# Patient Record
Sex: MALE | Race: WHITE | NOT HISPANIC OR LATINO | Employment: OTHER
[De-identification: names, ages, dates, MRNs, and addresses within clinical notes are randomized per-mention and may not be internally consistent; named-entity substitution may affect disease eponyms.]

---

## 2023-12-19 ENCOUNTER — EXTERNAL RECORD (OUTPATIENT)
Dept: HEALTH INFORMATION MANAGEMENT | Facility: OTHER | Age: 63
End: 2023-12-19

## 2024-02-13 RX ORDER — ATORVASTATIN CALCIUM 20 MG/1
20 TABLET, FILM COATED ORAL DAILY
COMMUNITY

## 2024-02-13 RX ORDER — HYDROCODONE BITARTRATE AND ACETAMINOPHEN 10; 325 MG/1; MG/1
1 TABLET ORAL EVERY 6 HOURS PRN
Status: ON HOLD | COMMUNITY
End: 2024-02-19

## 2024-02-13 RX ORDER — FLUTICASONE PROPIONATE 50 MCG
1 SPRAY, SUSPENSION (ML) NASAL DAILY
COMMUNITY

## 2024-02-13 RX ORDER — GABAPENTIN 300 MG/1
300 CAPSULE ORAL 2 TIMES DAILY
COMMUNITY

## 2024-02-13 RX ORDER — MONTELUKAST SODIUM 10 MG/1
10 TABLET ORAL DAILY
COMMUNITY

## 2024-02-13 RX ORDER — ALBUTEROL SULFATE 90 UG/1
2 AEROSOL, METERED RESPIRATORY (INHALATION) EVERY 6 HOURS PRN
COMMUNITY

## 2024-02-13 RX ORDER — ENALAPRIL MALEATE 5 MG/1
5 TABLET ORAL DAILY
COMMUNITY

## 2024-02-13 RX ORDER — METOPROLOL SUCCINATE 25 MG/1
25 TABLET, EXTENDED RELEASE ORAL DAILY
COMMUNITY

## 2024-02-17 ENCOUNTER — LAB ENCOUNTER (OUTPATIENT)
Dept: LAB | Facility: HOSPITAL | Age: 64
End: 2024-02-17
Attending: ORTHOPAEDIC SURGERY
Payer: MEDICARE

## 2024-02-17 DIAGNOSIS — Z01.818 PRE-OP TESTING: ICD-10-CM

## 2024-02-17 LAB
ANTIBODY SCREEN: NEGATIVE
RH BLOOD TYPE: POSITIVE
RH BLOOD TYPE: POSITIVE

## 2024-02-17 PROCEDURE — 86901 BLOOD TYPING SEROLOGIC RH(D): CPT

## 2024-02-17 PROCEDURE — 36415 COLL VENOUS BLD VENIPUNCTURE: CPT

## 2024-02-17 PROCEDURE — 86900 BLOOD TYPING SEROLOGIC ABO: CPT

## 2024-02-17 PROCEDURE — 86850 RBC ANTIBODY SCREEN: CPT

## 2024-02-19 ENCOUNTER — HOSPITAL ENCOUNTER (INPATIENT)
Facility: HOSPITAL | Age: 64
LOS: 2 days | Discharge: LEFT BEFORE DISCHARGE | End: 2024-02-21
Attending: ORTHOPAEDIC SURGERY | Admitting: ORTHOPAEDIC SURGERY
Payer: MEDICARE

## 2024-02-19 ENCOUNTER — ANESTHESIA (OUTPATIENT)
Dept: SURGERY | Facility: HOSPITAL | Age: 64
End: 2024-02-19
Payer: MEDICARE

## 2024-02-19 ENCOUNTER — APPOINTMENT (OUTPATIENT)
Dept: GENERAL RADIOLOGY | Facility: HOSPITAL | Age: 64
End: 2024-02-19
Attending: ORTHOPAEDIC SURGERY
Payer: MEDICARE

## 2024-02-19 ENCOUNTER — ANESTHESIA EVENT (OUTPATIENT)
Dept: SURGERY | Facility: HOSPITAL | Age: 64
End: 2024-02-19
Payer: MEDICARE

## 2024-02-19 DIAGNOSIS — Z01.818 PRE-OP TESTING: Primary | ICD-10-CM

## 2024-02-19 PROBLEM — M54.16 LUMBAR RADICULOPATHY: Status: ACTIVE | Noted: 2024-02-19

## 2024-02-19 PROCEDURE — 01NB0ZZ RELEASE LUMBAR NERVE, OPEN APPROACH: ICD-10-PCS | Performed by: ORTHOPAEDIC SURGERY

## 2024-02-19 PROCEDURE — 00NY0ZZ RELEASE LUMBAR SPINAL CORD, OPEN APPROACH: ICD-10-PCS | Performed by: ORTHOPAEDIC SURGERY

## 2024-02-19 PROCEDURE — 0ST20ZZ RESECTION OF LUMBAR VERTEBRAL DISC, OPEN APPROACH: ICD-10-PCS | Performed by: ORTHOPAEDIC SURGERY

## 2024-02-19 PROCEDURE — 0SG1071 FUSION OF 2 OR MORE LUMBAR VERTEBRAL JOINTS WITH AUTOLOGOUS TISSUE SUBSTITUTE, POSTERIOR APPROACH, POSTERIOR COLUMN, OPEN APPROACH: ICD-10-PCS | Performed by: ORTHOPAEDIC SURGERY

## 2024-02-19 PROCEDURE — 76000 FLUOROSCOPY <1 HR PHYS/QHP: CPT | Performed by: ORTHOPAEDIC SURGERY

## 2024-02-19 PROCEDURE — HZ2ZZZZ DETOXIFICATION SERVICES FOR SUBSTANCE ABUSE TREATMENT: ICD-10-PCS | Performed by: INTERNAL MEDICINE

## 2024-02-19 PROCEDURE — 0SG10AJ FUSION OF 2 OR MORE LUMBAR VERTEBRAL JOINTS WITH INTERBODY FUSION DEVICE, POSTERIOR APPROACH, ANTERIOR COLUMN, OPEN APPROACH: ICD-10-PCS | Performed by: ORTHOPAEDIC SURGERY

## 2024-02-19 PROCEDURE — 4A11X4G MONITORING OF PERIPHERAL NERVOUS ELECTRICAL ACTIVITY, INTRAOPERATIVE, EXTERNAL APPROACH: ICD-10-PCS | Performed by: ORTHOPAEDIC SURGERY

## 2024-02-19 DEVICE — OSTEOCEL PRO LARGE BULK BUY: Type: IMPLANTABLE DEVICE | Site: BACK | Status: FUNCTIONAL

## 2024-02-19 DEVICE — CAGE COHERE TLIF-O 4 14X10X30: Type: IMPLANTABLE DEVICE | Site: BACK | Status: FUNCTIONAL

## 2024-02-19 DEVICE — IMPLANTABLE DEVICE: Type: IMPLANTABLE DEVICE | Site: BACK | Status: FUNCTIONAL

## 2024-02-19 DEVICE — SCREW SPNL L50MM OD6.5MM 2C REDUC RELINE: Type: IMPLANTABLE DEVICE | Site: BACK | Status: FUNCTIONAL

## 2024-02-19 DEVICE — SCREW SPNL L45MM OD6.5MM 2C REDUC RELINE: Type: IMPLANTABLE DEVICE | Site: BACK | Status: FUNCTIONAL

## 2024-02-19 DEVICE — K WIRE FIX NIT BVL BLNT TIP PRECEPT: Type: IMPLANTABLE DEVICE | Site: BACK

## 2024-02-19 DEVICE — SCREW SPNL DIA5.5MM OPN TULIP LOK RELINE: Type: IMPLANTABLE DEVICE | Site: BACK | Status: FUNCTIONAL

## 2024-02-19 RX ORDER — MORPHINE SULFATE 10 MG/ML
6 INJECTION, SOLUTION INTRAMUSCULAR; INTRAVENOUS EVERY 10 MIN PRN
Status: DISCONTINUED | OUTPATIENT
Start: 2024-02-19 | End: 2024-02-19 | Stop reason: HOSPADM

## 2024-02-19 RX ORDER — GLYCOPYRROLATE 0.2 MG/ML
INJECTION, SOLUTION INTRAMUSCULAR; INTRAVENOUS AS NEEDED
Status: DISCONTINUED | OUTPATIENT
Start: 2024-02-19 | End: 2024-02-19 | Stop reason: SURG

## 2024-02-19 RX ORDER — OXYCODONE AND ACETAMINOPHEN 10; 325 MG/1; MG/1
1 TABLET ORAL EVERY 4 HOURS PRN
Status: DISCONTINUED | OUTPATIENT
Start: 2024-02-19 | End: 2024-02-21

## 2024-02-19 RX ORDER — HYDROMORPHONE HYDROCHLORIDE 1 MG/ML
0.6 INJECTION, SOLUTION INTRAMUSCULAR; INTRAVENOUS; SUBCUTANEOUS EVERY 5 MIN PRN
Status: DISCONTINUED | OUTPATIENT
Start: 2024-02-19 | End: 2024-02-19 | Stop reason: HOSPADM

## 2024-02-19 RX ORDER — HYDROMORPHONE HYDROCHLORIDE 1 MG/ML
0.2 INJECTION, SOLUTION INTRAMUSCULAR; INTRAVENOUS; SUBCUTANEOUS EVERY 5 MIN PRN
Status: DISCONTINUED | OUTPATIENT
Start: 2024-02-19 | End: 2024-02-19 | Stop reason: HOSPADM

## 2024-02-19 RX ORDER — DEXAMETHASONE SODIUM PHOSPHATE 4 MG/ML
VIAL (ML) INJECTION AS NEEDED
Status: DISCONTINUED | OUTPATIENT
Start: 2024-02-19 | End: 2024-02-19 | Stop reason: SURG

## 2024-02-19 RX ORDER — OXYCODONE AND ACETAMINOPHEN 10; 325 MG/1; MG/1
2 TABLET ORAL EVERY 4 HOURS PRN
Status: DISCONTINUED | OUTPATIENT
Start: 2024-02-19 | End: 2024-02-21

## 2024-02-19 RX ORDER — ACETAMINOPHEN 500 MG
1000 TABLET ORAL ONCE
Status: COMPLETED | OUTPATIENT
Start: 2024-02-19 | End: 2024-02-19

## 2024-02-19 RX ORDER — NALOXONE HYDROCHLORIDE 0.4 MG/ML
0.08 INJECTION, SOLUTION INTRAMUSCULAR; INTRAVENOUS; SUBCUTANEOUS AS NEEDED
Status: DISCONTINUED | OUTPATIENT
Start: 2024-02-19 | End: 2024-02-19 | Stop reason: HOSPADM

## 2024-02-19 RX ORDER — LORAZEPAM 1 MG/1
1 TABLET ORAL
Status: DISCONTINUED | OUTPATIENT
Start: 2024-02-19 | End: 2024-02-21

## 2024-02-19 RX ORDER — BUPIVACAINE HYDROCHLORIDE AND EPINEPHRINE 5; 5 MG/ML; UG/ML
INJECTION, SOLUTION PERINEURAL AS NEEDED
Status: DISCONTINUED | OUTPATIENT
Start: 2024-02-19 | End: 2024-02-19 | Stop reason: HOSPADM

## 2024-02-19 RX ORDER — DIPHENHYDRAMINE HYDROCHLORIDE 50 MG/ML
25 INJECTION INTRAMUSCULAR; INTRAVENOUS EVERY 4 HOURS PRN
Status: DISCONTINUED | OUTPATIENT
Start: 2024-02-19 | End: 2024-02-21

## 2024-02-19 RX ORDER — LIDOCAINE HYDROCHLORIDE 10 MG/ML
INJECTION, SOLUTION EPIDURAL; INFILTRATION; INTRACAUDAL; PERINEURAL AS NEEDED
Status: DISCONTINUED | OUTPATIENT
Start: 2024-02-19 | End: 2024-02-19 | Stop reason: SURG

## 2024-02-19 RX ORDER — POLYETHYLENE GLYCOL 3350 17 G/17G
17 POWDER, FOR SOLUTION ORAL DAILY PRN
Status: DISCONTINUED | OUTPATIENT
Start: 2024-02-19 | End: 2024-02-21

## 2024-02-19 RX ORDER — SODIUM CHLORIDE, SODIUM LACTATE, POTASSIUM CHLORIDE, CALCIUM CHLORIDE 600; 310; 30; 20 MG/100ML; MG/100ML; MG/100ML; MG/100ML
INJECTION, SOLUTION INTRAVENOUS CONTINUOUS
Status: DISCONTINUED | OUTPATIENT
Start: 2024-02-19 | End: 2024-02-19 | Stop reason: HOSPADM

## 2024-02-19 RX ORDER — SODIUM CHLORIDE 9 MG/ML
INJECTION, SOLUTION INTRAVENOUS CONTINUOUS
Status: DISCONTINUED | OUTPATIENT
Start: 2024-02-19 | End: 2024-02-21

## 2024-02-19 RX ORDER — ONDANSETRON 2 MG/ML
INJECTION INTRAMUSCULAR; INTRAVENOUS AS NEEDED
Status: DISCONTINUED | OUTPATIENT
Start: 2024-02-19 | End: 2024-02-19 | Stop reason: SURG

## 2024-02-19 RX ORDER — MIDAZOLAM HYDROCHLORIDE 1 MG/ML
INJECTION INTRAMUSCULAR; INTRAVENOUS AS NEEDED
Status: DISCONTINUED | OUTPATIENT
Start: 2024-02-19 | End: 2024-02-19 | Stop reason: SURG

## 2024-02-19 RX ORDER — ENEMA 19; 7 G/133ML; G/133ML
1 ENEMA RECTAL ONCE AS NEEDED
Status: DISCONTINUED | OUTPATIENT
Start: 2024-02-19 | End: 2024-02-21

## 2024-02-19 RX ORDER — LORAZEPAM 2 MG/ML
1 INJECTION INTRAMUSCULAR
Status: DISCONTINUED | OUTPATIENT
Start: 2024-02-19 | End: 2024-02-19 | Stop reason: RX

## 2024-02-19 RX ORDER — METOCLOPRAMIDE HYDROCHLORIDE 5 MG/ML
10 INJECTION INTRAMUSCULAR; INTRAVENOUS EVERY 8 HOURS PRN
Status: DISCONTINUED | OUTPATIENT
Start: 2024-02-19 | End: 2024-02-21

## 2024-02-19 RX ORDER — LORAZEPAM 2 MG/ML
2 INJECTION INTRAMUSCULAR
Status: DISCONTINUED | OUTPATIENT
Start: 2024-02-19 | End: 2024-02-19 | Stop reason: RX

## 2024-02-19 RX ORDER — DIPHENHYDRAMINE HCL 25 MG
25 CAPSULE ORAL EVERY 4 HOURS PRN
Status: DISCONTINUED | OUTPATIENT
Start: 2024-02-19 | End: 2024-02-21

## 2024-02-19 RX ORDER — ONDANSETRON 2 MG/ML
4 INJECTION INTRAMUSCULAR; INTRAVENOUS EVERY 6 HOURS PRN
Status: DISCONTINUED | OUTPATIENT
Start: 2024-02-19 | End: 2024-02-21

## 2024-02-19 RX ORDER — HYDROMORPHONE HYDROCHLORIDE 1 MG/ML
0.8 INJECTION, SOLUTION INTRAMUSCULAR; INTRAVENOUS; SUBCUTANEOUS EVERY 2 HOUR PRN
Status: DISCONTINUED | OUTPATIENT
Start: 2024-02-19 | End: 2024-02-21

## 2024-02-19 RX ORDER — HYDROMORPHONE HYDROCHLORIDE 1 MG/ML
0.4 INJECTION, SOLUTION INTRAMUSCULAR; INTRAVENOUS; SUBCUTANEOUS EVERY 2 HOUR PRN
Status: DISCONTINUED | OUTPATIENT
Start: 2024-02-19 | End: 2024-02-21

## 2024-02-19 RX ORDER — BISACODYL 10 MG
10 SUPPOSITORY, RECTAL RECTAL
Status: DISCONTINUED | OUTPATIENT
Start: 2024-02-19 | End: 2024-02-21

## 2024-02-19 RX ORDER — MIDAZOLAM HYDROCHLORIDE 1 MG/ML
2 INJECTION INTRAMUSCULAR; INTRAVENOUS ONCE
Status: COMPLETED | OUTPATIENT
Start: 2024-02-19 | End: 2024-02-19

## 2024-02-19 RX ORDER — MORPHINE SULFATE 4 MG/ML
2 INJECTION, SOLUTION INTRAMUSCULAR; INTRAVENOUS EVERY 10 MIN PRN
Status: DISCONTINUED | OUTPATIENT
Start: 2024-02-19 | End: 2024-02-19 | Stop reason: HOSPADM

## 2024-02-19 RX ORDER — DOCUSATE SODIUM 100 MG/1
100 CAPSULE, LIQUID FILLED ORAL 2 TIMES DAILY
Status: DISCONTINUED | OUTPATIENT
Start: 2024-02-19 | End: 2024-02-21

## 2024-02-19 RX ORDER — SODIUM CHLORIDE, SODIUM LACTATE, POTASSIUM CHLORIDE, CALCIUM CHLORIDE 600; 310; 30; 20 MG/100ML; MG/100ML; MG/100ML; MG/100ML
INJECTION, SOLUTION INTRAVENOUS CONTINUOUS
Status: DISCONTINUED | OUTPATIENT
Start: 2024-02-19 | End: 2024-02-21

## 2024-02-19 RX ORDER — LORAZEPAM 1 MG/1
2 TABLET ORAL
Status: DISCONTINUED | OUTPATIENT
Start: 2024-02-19 | End: 2024-02-21

## 2024-02-19 RX ORDER — HYDROMORPHONE HYDROCHLORIDE 1 MG/ML
0.4 INJECTION, SOLUTION INTRAMUSCULAR; INTRAVENOUS; SUBCUTANEOUS EVERY 5 MIN PRN
Status: DISCONTINUED | OUTPATIENT
Start: 2024-02-19 | End: 2024-02-19 | Stop reason: HOSPADM

## 2024-02-19 RX ORDER — MORPHINE SULFATE 4 MG/ML
4 INJECTION, SOLUTION INTRAMUSCULAR; INTRAVENOUS EVERY 10 MIN PRN
Status: DISCONTINUED | OUTPATIENT
Start: 2024-02-19 | End: 2024-02-19 | Stop reason: HOSPADM

## 2024-02-19 RX ORDER — CEFAZOLIN SODIUM/WATER 2 G/20 ML
2 SYRINGE (ML) INTRAVENOUS
Status: COMPLETED | OUTPATIENT
Start: 2024-02-19 | End: 2024-02-19

## 2024-02-19 RX ORDER — SENNOSIDES 8.6 MG
17.2 TABLET ORAL NIGHTLY
Status: DISCONTINUED | OUTPATIENT
Start: 2024-02-19 | End: 2024-02-21

## 2024-02-19 RX ORDER — CYCLOBENZAPRINE HCL 5 MG
5 TABLET ORAL 3 TIMES DAILY
Status: DISCONTINUED | OUTPATIENT
Start: 2024-02-19 | End: 2024-02-21

## 2024-02-19 RX ADMIN — SODIUM CHLORIDE, SODIUM LACTATE, POTASSIUM CHLORIDE, CALCIUM CHLORIDE: 600; 310; 30; 20 INJECTION, SOLUTION INTRAVENOUS at 14:35:00

## 2024-02-19 RX ADMIN — GLYCOPYRROLATE 0.2 MG: 0.2 INJECTION, SOLUTION INTRAMUSCULAR; INTRAVENOUS at 11:19:00

## 2024-02-19 RX ADMIN — MIDAZOLAM HYDROCHLORIDE 2 MG: 1 INJECTION INTRAMUSCULAR; INTRAVENOUS at 11:19:00

## 2024-02-19 RX ADMIN — ONDANSETRON 4 MG: 2 INJECTION INTRAMUSCULAR; INTRAVENOUS at 11:19:00

## 2024-02-19 RX ADMIN — CEFAZOLIN SODIUM/WATER 2 G: 2 G/20 ML SYRINGE (ML) INTRAVENOUS at 11:18:00

## 2024-02-19 RX ADMIN — SODIUM CHLORIDE, SODIUM LACTATE, POTASSIUM CHLORIDE, CALCIUM CHLORIDE: 600; 310; 30; 20 INJECTION, SOLUTION INTRAVENOUS at 11:16:00

## 2024-02-19 RX ADMIN — LIDOCAINE HYDROCHLORIDE 25 MG: 10 INJECTION, SOLUTION EPIDURAL; INFILTRATION; INTRACAUDAL; PERINEURAL at 11:19:00

## 2024-02-19 RX ADMIN — DEXAMETHASONE SODIUM PHOSPHATE 4 MG: 4 MG/ML VIAL (ML) INJECTION at 11:19:00

## 2024-02-19 RX ADMIN — SODIUM CHLORIDE, SODIUM LACTATE, POTASSIUM CHLORIDE, CALCIUM CHLORIDE: 600; 310; 30; 20 INJECTION, SOLUTION INTRAVENOUS at 12:13:00

## 2024-02-19 NOTE — H&P
History & Physical Examination    Patient Name: Facundo Crespo  MRN: [id number]  [facility]: [id number]  YOB: 1960    Diagnosis: lumbar stenosis, lumbar radiculopathy    Present Illness: Plan for L3-5 transforaminal lumbar interbody fusion with posterior instrumentation, use of allograft bone by Dr. Villafana    Medications Prior to Admission   Medication Sig Dispense Refill Last Dose    montelukast 10 MG Oral Tab Take 1 tablet (10 mg total) by mouth daily.   2/19/2024 at 600    fluticasone propionate 50 MCG/ACT Nasal Suspension 1 spray by Nasal route daily.   2/19/2024 at 600    budeson-glycopyrrol-formoterol 160-9-4.8 MCG/ACT Inhalation Aerosol Inhale 2 puffs into the lungs 2 (two) times daily.   2/19/2024 at 600    metoprolol succinate ER 25 MG Oral Tablet 24 Hr Take 1 tablet (25 mg total) by mouth daily.   2/19/2024 at 600    atorvastatin 20 MG Oral Tab Take 1 tablet (20 mg total) by mouth daily.   2/19/2024 at 600    enalapril 5 MG Oral Tab Take 1 tablet (5 mg total) by mouth daily.   2/19/2024 at 600    albuterol 108 (90 Base) MCG/ACT Inhalation Aero Soln Inhale 2 puffs into the lungs every 6 (six) hours as needed for Wheezing.   2/19/2024 at 800    albuterol (5 MG/ML) 0.5% Inhalation Nebu Soln Take 0.5 mL (2.5 mg total) by nebulization every 6 (six) hours as needed for Wheezing.   2/18/2024 at 2000    cholecalciferol 125 MCG (5000 UT) Oral Tab Take 1 tablet (5,000 Units total) by mouth daily.   2/12/2024    gabapentin 300 MG Oral Cap Take 1 capsule (300 mg total) by mouth in the morning and 1 capsule (300 mg total) before bedtime.   2/19/2024 at 600    HYDROcodone-acetaminophen  MG Oral Tab Take 1 tablet by mouth every 6 (six) hours as needed for Pain.   2/18/2024 at 2000     Current Facility-Administered Medications   Medication Dose Route Frequency    lactated ringers infusion   Intravenous Continuous    metoprolol tartrate (Lopressor) tab 25 mg  25 mg Oral Once PRN    ceFAZolin (Ancef) 2 g  in 20mL IV syringe premix  2 g Intravenous 30 Min Pre-Op       Allergies:   Allergies   Allergen Reactions    Penicillins HIVES       Past Medical History:   Diagnosis Date    Back problem     COPD (chronic obstructive pulmonary disease) (HCC)     High blood pressure     High cholesterol     Hx of motion sickness     Visual impairment     glasses     History reviewed. No pertinent surgical history.  History reviewed. No pertinent family history.  Social History     Tobacco Use    Smoking status: Former     Types: Cigarettes     Quit date:      Years since quittin.1    Smokeless tobacco: Never   Substance Use Topics    Alcohol use: Yes     Comment: social       SYSTEM Check if Review is Normal Check if Physical Exam is Normal If not normal, please explain:   HEENT [X] [ ]    NECK & BACK [X ] [ ]    HEART [X ] [ ]    LUNGS [X ] [ ]    ABDOMEN [X ] [ ]    UROGENITAL [X ] [ ]    EXTREMITIES [ ] [ ]    OTHER        [ x ] I have discussed the risks and benefits and alternatives with the patient/family.  They understand and agree to proceed with plan of care.  [ x ] I have reviewed the History and Physical done within the last 30 days.  Any changes noted above.    Curtis Morley PA-C  2024  10:28 AM

## 2024-02-19 NOTE — BRIEF OP NOTE
Pre-Operative Diagnosis: Low back pain, not otherwise specified, lumbar radiculopathy, degenerative lumbar spinal stenosis, degeneration of lumbar interverebral disc     Post-Operative Diagnosis: Low back pain, not otherwise specified, lumbar radiculopathy, degenerative lumbar spinal stenosis, degeneration of lumbar interverebral disc      Procedure Performed:   L3-4, L4-5 transforaminal lumbar interbody fusion with posterior instrumentation and use of allograft bone and use of bone morphogenic protein    Surgeon(s) and Role:     * Eugenia Villafana MD - Primary    Assistant(s):  PA: Curits Morley PA-C; Suze Aguiar PA     Surgical Findings:      Specimen: none     Estimated Blood Loss: 100cc    Dictation Number:      TAI Nieto  2/19/2024  1:29 PM

## 2024-02-19 NOTE — PLAN OF CARE
Patient alert and orientated x4. VSS. RA. POD #0. Dressing in place to lower back- hemovac in place. LSO brace to be on at all times- pt refused due to pain- educated on the importance of LSO brace pt still refused. PRN Percoset given for pain with scheduled flexeril. Plan PT/OT eval for discharge planning. Plan of care on going.               Problem: Patient Centered Care  Goal: Patient preferences are identified and integrated in the patient's plan of care  Description: Interventions:  - What would you like us to know as we care for you?   - Provide timely, complete, and accurate information to patient/family  - Incorporate patient and family knowledge, values, beliefs, and cultural backgrounds into the planning and delivery of care  - Encourage patient/family to participate in care and decision-making at the level they choose  - Honor patient and family perspectives and choices  Outcome: Progressing     Problem: Patient/Family Goals  Goal: Patient/Family Long Term Goal  Description: Patient's Long Term Goal:     Interventions:  - See additional Care Plan goals for specific interventions  Outcome: Progressing  Goal: Patient/Family Short Term Goal  Description: Patient's Short Term Goal:     Interventions:   - See additional Care Plan goals for specific interventions  Outcome: Progressing

## 2024-02-19 NOTE — ANESTHESIA POSTPROCEDURE EVALUATION
Patient: Facundo Crespo    Procedure Summary       Date: 02/19/24 Room / Location: [facility] MAIN OR [address] [place] [address] MAIN OR    Anesthesia Start: 1117 Anesthesia Stop:     Procedure: L3-4, L4-5 transforaminal lumbar interbody fusion with posterior instrumentation and use of allograft bone (Spine Lumbar) Diagnosis: (Low back pain, not otherwise specified, lumbar radiculopathy, degenerative lumbar spinal stenosis, degeneration of lumbar interverebral disc)    Surgeons: Eugenia Villafana MD Anesthesiologist: Nick Dunn MD    Anesthesia Type: general ASA Status: 2            Anesthesia Type: general    Vitals Value Taken Time   /74 02/19/24 1434   Temp 97.8 °F (36.6 °C) 02/19/24 1434   Pulse 75 02/19/24 1434   Resp 22 02/19/24 1434   SpO2 95 % 02/19/24 1434   Vitals shown include unfiled device data.    [facility] AN Post Evaluation:   Patient Evaluated in PACU  Patient Participation: complete - patient participated  Level of Consciousness: awake  Pain Management: adequate  Airway Patency:patent  Dental exam unchanged from preop  Yes    Cardiovascular Status: acceptable  Respiratory Status: acceptable  Postoperative Hydration acceptable      NICK DUNN MD  2/19/2024 2:35 PM

## 2024-02-19 NOTE — ANESTHESIA PROCEDURE NOTES
Airway  Date/Time: 2/19/2024 11:20 AM  Urgency: elective    Airway not difficult    General Information and Staff    Patient location during procedure: OR  Anesthesiologist: Nick Colón MD  Performed: anesthesiologist   Performed by: Nick Colón MD  Authorized by: Nick Colón MD      Indications and Patient Condition  Indications for airway management: anesthesia  Spontaneous Ventilation: absent  Preoxygenated: yes  Patient position: sniffing  Mask difficulty assessment: 1 - vent by mask  Planned trial extubation    Final Airway Details  Final airway type: endotracheal airway      Successful airway: ETT  Cuffed: yes   Successful intubation technique: direct laryngoscopy  Facilitating devices/methods: cricoid pressure and intubating stylet  Endotracheal tube insertion site: oral  Blade: Chet  Blade size: #3  ETT size (mm): 7.5    Cormack-Lehane Classification: grade I - full view of glottis  Placement verified by: capnometry   Cuff volume (mL): 6  Measured from: teeth  ETT to teeth (cm): 21  Number of attempts at approach: 1  Number of other approaches attempted: 0

## 2024-02-19 NOTE — ANESTHESIA PREPROCEDURE EVALUATION
Anesthesia PreOp Note    HPI:     Facundo Crespo is a 63 year old male who presents for preoperative consultation requested by: Eugenia Villafana MD    Date of Surgery: 2/19/2024    Procedure(s):  L3-4, L4-5 transforaminal lumbar interbody fusion with posterior instrumentation and use of allograft bone  Indication: Low back pain, not otherwise specified, lumbar radiculopathy, degenerative lumbar spinal stenosis, degeneration of lumbar interverebral disc    Relevant Problems   No relevant active problems       NPO:                         History Review:  There are no problems to display for this patient.      Past Medical History:   Diagnosis Date    Back problem     COPD (chronic obstructive pulmonary disease) (HCC)     High blood pressure     High cholesterol     Hx of motion sickness     Visual impairment     glasses       History reviewed. No pertinent surgical history.    Medications Prior to Admission   Medication Sig Dispense Refill Last Dose    montelukast 10 MG Oral Tab Take 1 tablet (10 mg total) by mouth daily.   2/19/2024 at 600    fluticasone propionate 50 MCG/ACT Nasal Suspension 1 spray by Nasal route daily.   2/19/2024 at 600    budeson-glycopyrrol-formoterol 160-9-4.8 MCG/ACT Inhalation Aerosol Inhale 2 puffs into the lungs 2 (two) times daily.   2/19/2024 at 600    metoprolol succinate ER 25 MG Oral Tablet 24 Hr Take 1 tablet (25 mg total) by mouth daily.   2/19/2024 at 600    atorvastatin 20 MG Oral Tab Take 1 tablet (20 mg total) by mouth daily.   2/19/2024 at 600    enalapril 5 MG Oral Tab Take 1 tablet (5 mg total) by mouth daily.   2/19/2024 at 600    albuterol 108 (90 Base) MCG/ACT Inhalation Aero Soln Inhale 2 puffs into the lungs every 6 (six) hours as needed for Wheezing.   2/19/2024 at 800    albuterol (5 MG/ML) 0.5% Inhalation Nebu Soln Take 0.5 mL (2.5 mg total) by nebulization every 6 (six) hours as needed for Wheezing.   2/18/2024 at 2000    cholecalciferol 125 MCG (5000 UT) Oral  Tab Take 1 tablet (5,000 Units total) by mouth daily.   2024    gabapentin 300 MG Oral Cap Take 1 capsule (300 mg total) by mouth in the morning and 1 capsule (300 mg total) before bedtime.   2024 at 600    HYDROcodone-acetaminophen  MG Oral Tab Take 1 tablet by mouth every 6 (six) hours as needed for Pain.   2024 at 2000     Current Facility-Administered Medications Ordered in Epic   Medication Dose Route Frequency Provider Last Rate Last Admin    lactated ringers infusion   Intravenous Continuous Eugenia Villafana MD        acetaminophen (Tylenol Extra Strength) tab 1,000 mg  1,000 mg Oral Once Eugenia Villafana MD        metoprolol tartrate (Lopressor) tab 25 mg  25 mg Oral Once PRN Eugenia Villafana MD        ceFAZolin (Ancef) 2 g in 20mL IV syringe premix  2 g Intravenous 30 Min Pre-Op Curtis Morley PA-C         No current [facility]-ordered outpatient medications on file.       Allergies   Allergen Reactions    Penicillins HIVES       History reviewed. No pertinent family history.  Social History     Socioeconomic History    Marital status: Single   Tobacco Use    Smoking status: Former     Types: Cigarettes     Quit date:      Years since quittin.1    Smokeless tobacco: Never   Vaping Use    Vaping Use: Never used   Substance and Sexual Activity    Alcohol use: Yes     Comment: social    Drug use: Never       Available pre-op labs reviewed.             Vital Signs:  Body mass index is 26.15 kg/m².   height is 1.676 m (5' 6\") and weight is 73.5 kg (162 lb). His oral temperature is 97.8 °F (36.6 °C). His blood pressure is 124/77 and his pulse is 80. His respiration is 20 and oxygen saturation is 97%.   Vitals:    24 1203 24 0856   BP:  124/77   Pulse:  80   Resp:  20   Temp:  97.8 °F (36.6 °C)   TempSrc:  Oral   SpO2:  97%   Weight: 72.6 kg (160 lb) 73.5 kg (162 lb)   Height: 1.676 m (5' 6\")         Anesthesia Evaluation     Patient summary reviewed and Nursing notes reviewed     Airway   Mallampati: II  TM distance: >3 FB  Neck ROM: full  Dental      Pulmonary - normal exam   (+) COPD    ROS comment: Ex smoker  Cardiovascular - normal exam  (+) hypertension    Neuro/Psych      Comments: Back pain    GI/Hepatic/Renal - negative ROS     Endo/Other - negative ROS   Abdominal                  Anesthesia Plan:   ASA:  2  Plan:   General  Airway:  ETT  Post-op Pain Management: IV analgesics  Informed Consent Plan and Risks Discussed With:  Patient  Use of Blood Products Discussed With:  Patient  Blood Product Use Consented    Discussed plan with:  Surgeon      I have informed Facundo Crespo and/or legal guardian or family member of the nature of the anesthetic plan, benefits, risks including possible dental damage if relevant, major complications, and any alternative forms of anesthetic management.   All of the patient's questions were answered to the best of my ability. The patient desires the anesthetic management as planned.  MYRTLE DUNN MD  2/19/2024 9:06 AM  Present on Admission:  **None**

## 2024-02-20 LAB
ANION GAP SERPL CALC-SCNC: 5 MMOL/L (ref 0–18)
BUN BLD-MCNC: 22 MG/DL (ref 9–23)
BUN/CREAT SERPL: 19 (ref 10–20)
CALCIUM BLD-MCNC: 9.2 MG/DL (ref 8.7–10.4)
CHLORIDE SERPL-SCNC: 104 MMOL/L (ref 98–112)
CO2 SERPL-SCNC: 28 MMOL/L (ref 21–32)
CREAT BLD-MCNC: 1.16 MG/DL
EGFRCR SERPLBLD CKD-EPI 2021: 70 ML/MIN/1.73M2 (ref 60–?)
GLUCOSE BLD-MCNC: 108 MG/DL (ref 70–99)
HCT VFR BLD AUTO: 42 %
HGB BLD-MCNC: 13.7 G/DL
OSMOLALITY SERPL CALC.SUM OF ELEC: 288 MOSM/KG (ref 275–295)
POTASSIUM SERPL-SCNC: 4.9 MMOL/L (ref 3.5–5.1)
SODIUM SERPL-SCNC: 137 MMOL/L (ref 136–145)

## 2024-02-20 PROCEDURE — 97165 OT EVAL LOW COMPLEX 30 MIN: CPT

## 2024-02-20 PROCEDURE — 80048 BASIC METABOLIC PNL TOTAL CA: CPT | Performed by: PHYSICIAN ASSISTANT

## 2024-02-20 PROCEDURE — 94640 AIRWAY INHALATION TREATMENT: CPT

## 2024-02-20 PROCEDURE — 97530 THERAPEUTIC ACTIVITIES: CPT

## 2024-02-20 PROCEDURE — 85014 HEMATOCRIT: CPT | Performed by: PHYSICIAN ASSISTANT

## 2024-02-20 PROCEDURE — 97162 PT EVAL MOD COMPLEX 30 MIN: CPT

## 2024-02-20 PROCEDURE — 85018 HEMOGLOBIN: CPT | Performed by: PHYSICIAN ASSISTANT

## 2024-02-20 PROCEDURE — 94799 UNLISTED PULMONARY SVC/PX: CPT

## 2024-02-20 RX ORDER — METOPROLOL SUCCINATE 25 MG/1
25 TABLET, EXTENDED RELEASE ORAL DAILY
Status: DISCONTINUED | OUTPATIENT
Start: 2024-02-20 | End: 2024-02-21

## 2024-02-20 RX ORDER — ALBUTEROL SULFATE 90 UG/1
2 AEROSOL, METERED RESPIRATORY (INHALATION) EVERY 6 HOURS PRN
Status: DISCONTINUED | OUTPATIENT
Start: 2024-02-20 | End: 2024-02-21

## 2024-02-20 RX ORDER — ENALAPRIL MALEATE 5 MG/1
5 TABLET ORAL DAILY
Status: DISCONTINUED | OUTPATIENT
Start: 2024-02-20 | End: 2024-02-21

## 2024-02-20 RX ORDER — GABAPENTIN 300 MG/1
300 CAPSULE ORAL 2 TIMES DAILY
Status: DISCONTINUED | OUTPATIENT
Start: 2024-02-20 | End: 2024-02-21

## 2024-02-20 RX ORDER — ATORVASTATIN CALCIUM 20 MG/1
20 TABLET, FILM COATED ORAL DAILY
Status: DISCONTINUED | OUTPATIENT
Start: 2024-02-20 | End: 2024-02-21

## 2024-02-20 RX ORDER — MONTELUKAST SODIUM 10 MG/1
10 TABLET ORAL DAILY
Status: DISCONTINUED | OUTPATIENT
Start: 2024-02-20 | End: 2024-02-21

## 2024-02-20 RX ORDER — FLUTICASONE PROPIONATE 50 MCG
1 SPRAY, SUSPENSION (ML) NASAL DAILY
Status: DISCONTINUED | OUTPATIENT
Start: 2024-02-20 | End: 2024-02-21

## 2024-02-20 NOTE — PHYSICAL THERAPY NOTE
PHYSICAL THERAPY EVALUATION - INPATIENT     Room Number: 420/420-A  Evaluation Date: 2024  Type of Evaluation: Initial   Physician Order: PT Eval and Treat    Presenting Problem: spine sx L5-S1  Co-Morbidities : ciwa  Reason for Therapy: Mobility Dysfunction and Discharge Planning    PHYSICAL THERAPY ASSESSMENT   Patient is a 63 year old male admitted 2024 for spine sx L3-5 performed .  Prior to admission, patient's baseline is independent per pt report.  Patient is currently functioning below baseline with bed mobility, transfers, and gait.  Patient is requiring minimal assist as a result of the following impairments: decreased functional strength, decreased endurance/aerobic capacity, pain, cognitive deficits (CIWA mental status), and medical status.  Physical Therapy will continue to follow for duration of hospitalization.    Patient will benefit from continued skilled PT Services at discharge to promote functional independence and safety with additional support and return home with home health PT.    PLAN  PT Treatment Plan: Bed mobility;Body mechanics;Endurance;Energy conservation;Family education;Gait training;Range of motion;Stoop training;Transfer training;Balance training  Rehab Potential : Fair  Frequency (Obs): Daily    PHYSICAL THERAPY MEDICAL/SOCIAL HISTORY   History related to current admission: lumbar radiculopathy     Problem List  Active Problems:    Lumbar radiculopathy      HOME SITUATION  Home Situation  Type of Home: Apartment  Home Layout: One level  Lives With: Spouse (Spouse works)  Drives: Yes  Patient Regularly Uses: Glasses     Prior Level of [place]: independent     SUBJECTIVE  \"It's black history month.\"     PHYSICAL THERAPY EXAMINATION   OBJECTIVE  Precautions: Spine;Lumbar brace  Fall Risk: High fall risk    WEIGHT BEARING RESTRICTION  Weight Bearing Restriction: None                PAIN ASSESSMENT  Ratin  Location: back pain and LLE tingling  Management  Techniques: Activity promotion;Body mechanics;Breathing techniques;Repositioning;Relaxation    COGNITION  Overall Cognitive Status:  WFL - within functional limits    BALANCE  Static Sitting: Fair +  Dynamic Sitting: Fair -  Static Standing: Poor +  Dynamic Standing: Poor +      ACTIVITY TOLERANCE           BP: 127/71  BP Location: Right arm  BP Method: Automatic  Patient Position: Sitting    O2 WALK       AM-PAC '6-Clicks' INPATIENT SHORT FORM - BASIC MOBILITY  How much difficulty does the patient currently have...  Patient Difficulty: Turning over in bed (including adjusting bedclothes, sheets and blankets)?: A Little   Patient Difficulty: Sitting down on and standing up from a chair with arms (e.g., wheelchair, bedside commode, etc.): A Little   Patient Difficulty: Moving from lying on back to sitting on the side of the bed?: A Little   How much help from another person does the patient currently need...   Help from Another: Moving to and from a bed to a chair (including a wheelchair)?: A Little   Help from Another: Need to walk in hospital room?: A Lot   Help from Another: Climbing 3-5 steps with a railing?: Total     AM-PAC Score:  Raw Score: 15   Approx Degree of Impairment: 57.7%   Standardized Score (AM-PAC Scale): 39.45   CMS Modifier (G-Code): CK    FUNCTIONAL ABILITY STATUS  Functional Mobility/Gait Assessment  Gait Assistance: Minimum assistance  Distance (ft): 10 ft  Assistive Device: Rolling walker  Pattern: Shuffle  Rolling:  NT up in chair  Supine to Sit: minimal assist  Sit to Supine: minimal assist  Sit to Stand: minimal assist    Exercise/Education Provided:  Body mechanics  Functional activity tolerated  Gait training  Transfer training    The patient's Approx Degree of Impairment: 57.7% has been calculated based on documentation in the [facility] '6 clicks' Inpatient Basic Mobility Short Form.  Research supports that patients with this level of impairment may benefit from GLENNY.  Final disposition will  be made by interdisciplinary medical team.    Patient End of Session: Up in chair;Needs met;Call light within reach;RN aware of session/findings;All patient questions and concerns addressed;Alarm set    CURRENT GOALS  Goals to be met by: 3/10  Patient Goal Patient's self-stated goal is: to go home    Goal #1 Patient is able to demonstrate supine - sit EOB @ level: supervision     Goal #1   Current Status    Goal #2 Patient is able to demonstrate transfers EOB to/from Chair/Wheelchair at assistance level: supervision with walker - rolling     Goal #2  Current Status    Goal #3 Patient is able to ambulate 150 feet with assist device: walker - rolling at assistance level: supervision   Goal #3   Current Status    Goal #4 Patient will negotiate 4 stairs/one curb w/ assistive device and supervision   Goal #4   Current Status    Goal #5 Patient to demonstrate independence with home activity/exercise instructions provided to patient in preparation for discharge.   Goal #5   Current Status    Goal #6    Goal #6  Current Status      Patient Evaluation Complexity Level:  History Moderate - 1 or 2 personal factors and/or co-morbidities   Examination of body systems Moderate - addressing a total of 3 or more elements   Clinical Presentation  Moderate - Evolving   Clinical Decision Making  Moderate Complexity     Therapeutic Activity:  12 minutes

## 2024-02-20 NOTE — H&P
[facility]    History and Physical    Facundo Crespo Patient Status:  Inpatient    1960 MRN [id number]   Location [facility] 4W/SW/SE Attending Coretta Brewster MD   Hosp Day # 1 PCP SREEDHAR TY     Date:  2024  Date of Admission:  2024      HPI:   No chief complaint on file.    HPI  Pt is a 62 y/o male with PMH of COPD, HTN, HLD and lumbar radiculopathy who was admitted for scheduled L3-4 and L4-5 lumbar fusion and decompression by Dr. Villafana on 24.     POD#1. Pt seen today sitting up in the chair. Falling asleep while talking. No c/o SOB or N/V/D. Pain controlled on current regimen.   History     Past Medical History:   Diagnosis Date    Back problem     COPD (chronic obstructive pulmonary disease) (HCC)     High blood pressure     High cholesterol     Hx of motion sickness     Visual impairment     glasses     History reviewed. No pertinent surgical history.  History reviewed. No pertinent family history.  Social History:  Social History     Socioeconomic History    Marital status: Single   Tobacco Use    Smoking status: Former     Types: Cigarettes     Quit date:      Years since quittin.1    Smokeless tobacco: Never   Vaping Use    Vaping Use: Never used   Substance and Sexual Activity    Alcohol use: Yes     Comment: social    Drug use: Never     Social Determinants of Health     Food Insecurity: No Food Insecurity (2024)    Food Insecurity     Food Insecurity: Never true   Transportation Needs: No Transportation Needs (2024)    Transportation Needs     Lack of Transportation: No   Housing Stability: Low Risk  (2024)    Housing Stability     Housing Instability: No     Allergies/Medications:   Allergies:   Allergies   Allergen Reactions    Penicillins HIVES     Medications Prior to Admission   Medication Sig    montelukast 10 MG Oral Tab Take 1 tablet (10 mg total) by mouth daily.    fluticasone propionate 50 MCG/ACT Nasal Suspension 1  spray by Nasal route daily.    budeson-glycopyrrol-formoterol 160-9-4.8 MCG/ACT Inhalation Aerosol Inhale 2 puffs into the lungs 2 (two) times daily.    metoprolol succinate ER 25 MG Oral Tablet 24 Hr Take 1 tablet (25 mg total) by mouth daily.    atorvastatin 20 MG Oral Tab Take 1 tablet (20 mg total) by mouth daily.    enalapril 5 MG Oral Tab Take 1 tablet (5 mg total) by mouth daily.    albuterol 108 (90 Base) MCG/ACT Inhalation Aero Soln Inhale 2 puffs into the lungs every 6 (six) hours as needed for Wheezing.    albuterol (5 MG/ML) 0.5% Inhalation Nebu Soln Take 0.5 mL (2.5 mg total) by nebulization every 6 (six) hours as needed for Wheezing.    cholecalciferol 125 MCG (5000 UT) Oral Tab Take 1 tablet (5,000 Units total) by mouth daily.    gabapentin 300 MG Oral Cap Take 1 capsule (300 mg total) by mouth in the morning and 1 capsule (300 mg total) before bedtime.       Review of Systems:     Constitutional:  Negative for chills and fever.   Respiratory:  Negative for cough and shortness of breath.    Cardiovascular:  Negative for chest pain and palpitations.   Musculoskeletal:  Positive for back pain.   Neurological:  Negative for dizziness and speech difficulty.   Psychiatric/Behavioral:  Negative for confusion and agitation.        Physical Exam:   Vital Signs:  Blood pressure 132/76, pulse 114, temperature 98.2 °F (36.8 °C), temperature source Oral, resp. rate 16, height 5' 6\" (1.676 m), weight 162 lb (73.5 kg), SpO2 95%.  Physical Exam  Vitals and nursing note reviewed.   Constitutional:       Appearance: Normal appearance.   HENT:      Head: Atraumatic.   Pulmonary:      Effort: Pulmonary effort is normal.   Abdominal:      General: Bowel sounds are normal.      Palpations: Abdomen is soft.   Musculoskeletal:      Lumbar back: Tenderness present. Decreased range of motion.      Comments: Lumbar brace   Skin:     General: Skin is warm and dry.   Neurological:      Mental Status: He is alert and oriented to  person, place, and time.   Psychiatric:         Mood and Affect: Mood normal.         Behavior: Behavior normal.           Results:     Lab Results   Component Value Date    HGB 13.7 02/20/2024    HCT 42.0 02/20/2024    CREATSERUM 1.16 02/20/2024    BUN 22 02/20/2024     02/20/2024    K 4.9 02/20/2024     02/20/2024    CO2 28.0 02/20/2024     (H) 02/20/2024    CA 9.2 02/20/2024     XR FLUOROSCOPY C-ARM TIME LESS THAN 1 HOUR (CPT=76000)    Result Date: 2/19/2024  CONCLUSION: Intraoperative fluoroscopy provided.  Please see surgical note for specific details.     Dictated by (CST): Mateo Barba MD on 2/19/2024 at 2:28 PM     Finalized by (CST): Mateo Barba MD on 2/19/2024 at 2:28 PM               Assessment/Plan:   *Lumbar radiculopathy  S/P L3-4 and L4-5 lumbar fusion and decompression by Dr. Villafana on 2/19/24.  POD#1  Lumbar brace  Pain control  PT/OT  Bowel protocol  Ortho following     *HLD  Cont atorvastatin    *HTN  Cont enalapril and metoprolol       DVT prophylaxis: SCDs  Code status: FULL CODE    **Certification      PHYSICIAN Certification of Need for Inpatient Hospitalization - Initial Certification    Patient will require inpatient services that will reasonably be expected to span two midnight's based on the clinical documentation in H+P.   Based on patients current state of illness, I anticipate that, after discharge, patient will require TBD.      DULCE Bains MD  2/20/2024

## 2024-02-20 NOTE — OPERATIVE REPORT
Show:Clear all  [x]Written[]Templated[x]Copied    Added by:  [x]Eugenia Villafana MD    []Hover for details        PREOPERATIVE DIAGNOSIS:    1.       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  2.       Lumbar radiculopathy.  3.       Lumbar stenosis.  4.       Lumbar Spondylolisthesis.     POSTOPERATIVE DIAGNOSIS:    1.       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  2.       Lumbar radiculopathy.  3.       Lumbar stenosis.  4.       Lumbar Spondylolisthesis.        PROCEDURE:    1.       L3-4 and L4-5 transforaminal lumbar interbody fusion.  2.       L3-4 and L4-5 posterior instrumented fusion.  3.       L3-4 and L4-5 use of PEEK interbody cage.  4.       L3-4 and L4-5 use of local autograft bone.  5.       L3-4 and L4-5 use of autograft bone  6.       L3-4 and L4-5 decompression.  7.       Use of interpretation C-arm fluoroscopy.  8.       Use of intraoperative neuromonitoring.        SURGEON:  Eugenia Villafana MD     ASSISTANT: Curtis Morley PA-C, assisted in positioning, prepping and draping, retracting, and wound closure.     ANESTHESIA:  General endotracheal.     COMPLICATIONS:  none.     ESTIMATED BLOOD LOSS:  Approximately 200 mL.     DEEP VENOUS THROMBOSIS PROPHYLAXIS:  SCDs and TEDs to lower extremities.     PREOPERATIVE ANTIBIOTICS:  Ancef.     INDICATIONS:  The patient is a 63-year-old male with a history of low back pain and lower extremity radicular symptoms in the L3-4 and L4-5 nerve root distribution that failed conservative management.  MRI of the lumbar spine showed evidence of foraminal and central narrowing at L3-4 and L4-5, mobile spondylolisthesis at L3-4.  I discussed these findings with the patient, as well as the treatment alternatives.  I recommended lumbar decompression, instrumented fusion.  I explained to the patient that there was need for a fusion due to the fact that the stenosis was in the foramen and that I would have to remove a substantial amount of the facet joint on the right side in order to  address the stenosis.  The patient understood that the risks of surgery include, but not limited to, infection, nerve injury, vessel injury, dural leak, hardware failure, pseudoarthrosis requiring revision surgery, deep venous thrombosis, pulmonary embolism, and death in the perioperative period.     OPERATIVE TECHNIQUE:  The patient was seen in preop and his surgical site was marked.  SCDs and TEDs were placed on the lower extremities.  He was brought to the OR and after successful induction of anesthesia, a Crowder catheter was placed, as well as probes for neuromonitoring of upper and lower extremities.  He was then gently positioned in a prone position on the Rubio table with an open Nirmal frame.  Bony prominences were well padded.  The back was prepped and draped in the usual sterile manner for a procedure of this sort.  Time-out was then performed.  We then marked the skin over the lateral edges of the pedicles of L3-L4-L5 and  bilaterally.  Two paraspinal incisions lateral to the skin markings were made.  Standard [place] approach was carried down to the L3-4 and L4-5 facet joints bilaterally.  I then exposed the transverse processes of L3, L4,  and L5 bilaterally and then decorticated them with a high-speed bur to create a bed for posterior arthrodesis.  Jamshidi needles and then guidewires were placed at each of the 6 pedicles with the use of fluoroscopic guidance.  Once they were in good position, self-retaining retractor was placed over the L4-5 faucet joint on the left side exposing both the facet joints as well as the L4 and L5 laminae on the left side.  An osteotome and a mallet were used to remove the faucet joint.  A high-speed bur was used to thin down the inferior half of the L4 lamina and the superior half of the L5 lamina on that side.  Full facetectomy was performed on the right side using an osteotome and mallet.  The bone was saved to be used as autograft.  Partial laminectomies of both were  performed.  Ligamentum flavum was then detached and removed thereby exposing the traversing and the exiting nerve roots.  I then gently retracted the nerve root medially.   We retracted the nerve root to expose the disc and used a knife to create an annulotomy.  Deidra, curettes and pituitaries were then used to perform a complete discectomy and prepare the endplates for fusion.  We trialed and elected to use a 14 x 30  mm cage.  Local autograft and allograft bone,, was placed into the cage as well as into the disc space and the cage inserted under fluoroscopic guidance.  Once it was in good position, we copiously irrigated the wound.     I remove the retractor and placed it at the L3-4 level, again exposing the facet joint and the L3 and L4 laminae.  A high-speed shara was used to remove the faucet joint as well as thin down the inferior aspect of the L3 lamina, superior aspect of the L4 lamina.  The bone was saved to be used as autograft.  The ligamentum flavum was detached and removed, decompressing the dura.  I created an annulotomy using a knife. I introduced deidra, curettes and pituitaries into the disc space and performed a complete diskectomy.  I prepared the endplates for fusion.  We again decided to use a 14 x 30 mm cage.  I placed local autograft and allograft bone into the cage and inserted under fluoroscopic guidance.  Once that was in good position, we copiously irrigated the wound and used bipolar to control bleeding from large epidural veins.  Once the wound was dry, we placed 6.5 mm pedicle screws in L3, L4 and L5 bilaterally.  A 70 mm juvencio was then used to connect the pedicle screws on each side with end caps and final tighteners.  The towers were removed.  The remainder of the local autograft bone, as well as the allograft bone was placed over the transverse processes on the right side in order to create an arthrodesis.  Local autograft and allograft bone was placed on the both side to create an  arthrodesis.  We took final AP and lateral images showing that the hardware was in good position.  We then closed the fascia using 0 Vicryl, subcutaneous using 2-0 Vicryl and Staples for the skin, glue for the skin.  Sterile dressing was applied.  The drapes were removed.  The patient was gently moved to the supine position on a regular bed and extubated in the OR.  He was taken to PACU in good condition.  I was present throughout the case.  All counts were correct

## 2024-02-20 NOTE — OCCUPATIONAL THERAPY NOTE
OCCUPATIONAL THERAPY EVALUATION - INPATIENT     Room Number: 420/420-A  Evaluation Date: 2/20/2024  Type of Evaluation: Initial  Presenting Problem:  (L3-5 TLIF)    Physician Order: IP Consult to Occupational Therapy  Reason for Therapy: ADL/IADL Dysfunction and Discharge Planning    OCCUPATIONAL THERAPY ASSESSMENT   Patient is a 63 year old male admitted 2/19/2024 for L3-L5 TLIF. Pt w/ spine precautions and order for LSO at all times following surgery. Prior to admission, patient reports being independent w/ adls, ambulation w/o ad and driving. Pt is a retired .  Patient is currently functioning below baseline with toileting, bathing, lower body dressing, grooming, eating, brace management, bed mobility, transfers, static standing balance, dynamic standing balance, and functional standing tolerance.  Patient is requiring minimal assist as a result of the following impairments: pain, difficulty maintaining precautions, cognitive deficits (increased confusion following surgery), decreased insight to deficits, and decreased safety awareness. Occupational Therapy will continue to follow for duration of hospitalization.    Patient will benefit from continued skilled OT Services at discharge to promote functional independence and safety with additional support and return home with home health OT    PLAN  OT Treatment Plan: Compensatory technique education;Patient/Family training;Patient/Family education;Endurance training;Functional transfer training;ADL training  OT Device Recommendations: TBD    OCCUPATIONAL THERAPY MEDICAL/SOCIAL HISTORY   Problem List   Active Problems:    Lumbar radiculopathy    HOME SITUATION  Type of Home: Apartment  Home Layout: One level  Lives With: Spouse (Spouse works)  Toilet and Equipment: Standard height toilet  Shower/Tub and Equipment: Tub-shower combo  Other Equipment: None  Occupation/Status: retired   Drives: Yes  Patient Regularly Uses: Glasses    Stairs in  Home: none  Assistive Device(s) Used: none     Prior Level of Charles: Pta pt was living w/ his wife in an apartment. Pt reports being independent w adls, ambulation w/ o ad and driving    SUBJECTIVE  \"I have to see those guys about signing the papers so I can get out of here\"    OCCUPATIONAL THERAPY EXAMINATION   OBJECTIVE  Precautions: Spine; Lumbar brace  Fall Risk: High fall risk    PAIN ASSESSMENT  Ratin  Location: -- (back)  Management Techniques: Activity promotion; Relaxation; Repositioning; Ice    ACTIVITY TOLERANCE  Limited by pain and confusion  /71    O2 SATURATIONS  Activity on room air    RANGE OF MOTION   Upper extremity ROM is within functional limits. Pt c/o persistent Lle numbness     STRENGTH ASSESSMENT  Upper extremity strength is within functional limits     ACTIVITIES OF DAILY LIVING ASSESSMENT  AM-PAC ‘6-Clicks’ Inpatient Daily Activity Short Form  How much help from another person does the patient currently need…  -   Putting on and taking off regular lower body clothing?: A Lot  -   Bathing (including washing, rinsing, drying)?: A Lot  -   Toileting, which includes using toilet, bedpan or urinal? : A Lot  -   Putting on and taking off regular upper body clothing?: A Little  -   Taking care of personal grooming such as brushing teeth?: A Little  -   Eating meals?: A Little    AM-PAC Score:  Score: 15  Approx Degree of Impairment: 56.46%  Standardized Score (AM-PAC Scale): 34.69  CMS Modifier (G-Code): CK    FUNCTIONAL ADL ASSESSMENT  Eating: setup assist  Grooming: min assist  UB Dressing: min assist  LB Dressing: max assist  Toileting: na    Skilled Therapy Provided: RN contacted prior to start of care. Treatment coordinated w/ PT. Pt agreeable to participation in therapy. Gait belt used during dynamic activity. Pt received up in chair, alert and oriented to self. Spine precautions reviewed w/ emphasis on adls and transfers. Need for brace at all times reinforced. Pt assisted  to june brace prior to activity. On initial contact pt required min a for sit<>stand from bedside chair. Pt ambulating short distance in the room w/ rw and min a;vc for walker management and chair follow provided for safety. Pt very drowsy throughout session;readily falling asleep mid sentence and having difficulty remaining alert and attentive. At end of session pt returned to chair;breakfast tray set up and all needs in reach;alarm on. RN aware of pt's status and performance in therapy    EDUCATION PROVIDED  Patient: Role of Occupational Therapy; Plan of Care; Functional Transfer Techniques; Fall Prevention; Surgical Precautions; Proper Body Mechanics; Bracing Education Provided  Patient's Response to Education: Requires Further Education    The patient's Approx Degree of Impairment: 56.46% has been calculated based on documentation in the [facility] '6 clicks' Inpatient Daily Activity Short Form.  Research supports that patients with this level of impairment may benefit from IRF. Recommendation at this time is for return to home w/ HH OT    Final disposition will be made by interdisciplinary medical team.    Patient End of Session: Up in chair;Needs met;Call light within reach;RN aware of session/findings;All patient questions and concerns addressed;Alarm set    OT Goals  Patient self-stated goal is: unstated     Patient will complete LE dressing with supervision  Comment:     Patient will complete functional transfer with supervision  Comment:     Patient will complete self care task at sink level with supervision   Comment:    Patient will independently recall spine precautions and incorporate them into functional tasks w/o prompts  Comment:         Goals  on:23  Frequency:3-5x/week    Patient Evaluation Complexity Level:   Occupational Profile/Medical History LOW - Brief history including review of medical or therapy records    Specific performance deficits impacting engagement in ADL/IADL LOW  1 - 3  performance deficits    Client Assessment/Performance Deficits MODERATE - Comorbidities and min to mod modifications of tasks    Clinical Decision Making LOW - Analysis of occupational profile, problem-focused assessments, limited treatment options    Overall Complexity LOW     Therapeutic Activity: 20 minutes

## 2024-02-20 NOTE — PLAN OF CARE
POD:1. Patient is on CIWA protocol. Patient is A&Ox4. RA. Refusing LOS brace. General diet. IVF infusing. Scds & teds for dvt prophylaxis. Removed Crowder in AM. Hemovac to bulb suction.  Will be a check void.  PRN Percocet & dilaudid for pain management. Scheduled flexeril. Up by 1 assist. Call light within reach, frequent rounding. Safety measures in place. Plan for PT/OT eval.        Problem: Patient Centered Care  Goal: Patient preferences are identified and integrated in the patient's plan of care  Description: Interventions:  - What would you like us to know as we care for you?   - Provide timely, complete, and accurate information to patient/family  - Incorporate patient and family knowledge, values, beliefs, and cultural backgrounds into the planning and delivery of care  - Encourage patient/family to participate in care and decision-making at the level they choose  - Honor patient and family perspectives and choices  Outcome: Progressing     Problem: Patient/Family Goals  Goal: Patient/Family Long Term Goal  Description: Patient's Long Term Goal:    Interventions:  -   - See additional Care Plan goals for specific interventions  Outcome: Progressing  Goal: Patient/Family Short Term Goal  Description: Patient's Short Term Goal:     Interventions:   -   - See additional Care Plan goals for specific interventions  Outcome: Progressing

## 2024-02-20 NOTE — PROGRESS NOTES
Ortho Spine Progress Note      No C/O of SOB NV. Pain controlled on Percocet 10. Leg symptoms improved. Patient states he would like to leave the hospital today. He has urinated on his own, awaiting PT/OT. He reports CP and states he has not been using his IS.  AVSS  Dressing is CDI  Motor to bilateral LE at baseline 5/5 throughout  NV intact. Distal pulses are palpable with good cap refill  Homans neg    Lab Results       Component                Value               Date                       HGB                      13.7                02/20/2024                 HCT                      42.0                02/20/2024                 CREATSERUM               1.16                02/20/2024                 BUN                      22                  02/20/2024                 NA                       137                 02/20/2024                 K                        4.9                 02/20/2024                 CL                       104                 02/20/2024                 CO2                      28.0                02/20/2024                 GLU                      108                 02/20/2024                 CA                       9.2                 02/20/2024                S/p L3-4 TLIF with posterior instrumentation by Dr. Villafana POD1    Mobilize  PT/OT  NO lifting over 15 pounds or ROM of the lumbar spine  Lumbar brace on at all times  Drain output 20cc since midnight, removed by me this am, tip intact.  May shower 48 hours after drain pulled with incision covered  Daily dressing changes to start on POD 2 or upon discharge  Please send patient home with gauze and tape  Rx meds in the chart  May d/c to home from ortho standpoint once cleared by Medicine and PT/OT  Please change dressing before the patient leaves    Follow up with Dr. Villafana in office in 2 weeks      Ivan Morley PA-C  PA with Dr. Clemente Villafana  [facility]

## 2024-02-21 VITALS
HEIGHT: 66 IN | RESPIRATION RATE: 22 BRPM | HEART RATE: 111 BPM | OXYGEN SATURATION: 96 % | DIASTOLIC BLOOD PRESSURE: 72 MMHG | TEMPERATURE: 98 F | BODY MASS INDEX: 26.03 KG/M2 | SYSTOLIC BLOOD PRESSURE: 137 MMHG | WEIGHT: 162 LBS

## 2024-02-21 LAB
ANION GAP SERPL CALC-SCNC: 8 MMOL/L (ref 0–18)
BUN BLD-MCNC: 19 MG/DL (ref 9–23)
BUN/CREAT SERPL: 17.4 (ref 10–20)
CALCIUM BLD-MCNC: 9 MG/DL (ref 8.7–10.4)
CHLORIDE SERPL-SCNC: 106 MMOL/L (ref 98–112)
CO2 SERPL-SCNC: 24 MMOL/L (ref 21–32)
CREAT BLD-MCNC: 1.09 MG/DL
EGFRCR SERPLBLD CKD-EPI 2021: 75 ML/MIN/1.73M2 (ref 60–?)
GLUCOSE BLD-MCNC: 78 MG/DL (ref 70–99)
OSMOLALITY SERPL CALC.SUM OF ELEC: 287 MOSM/KG (ref 275–295)
POTASSIUM SERPL-SCNC: 3.9 MMOL/L (ref 3.5–5.1)
SODIUM SERPL-SCNC: 138 MMOL/L (ref 136–145)

## 2024-02-21 PROCEDURE — 80048 BASIC METABOLIC PNL TOTAL CA: CPT | Performed by: PHYSICIAN ASSISTANT

## 2024-02-21 RX ORDER — OXYCODONE AND ACETAMINOPHEN 10; 325 MG/1; MG/1
1 TABLET ORAL EVERY 4 HOURS PRN
Status: DISCONTINUED | OUTPATIENT
Start: 2024-02-21 | End: 2024-02-21

## 2024-02-21 RX ORDER — OXYCODONE HYDROCHLORIDE AND ACETAMINOPHEN 5; 325 MG/1; MG/1
1 TABLET ORAL EVERY 4 HOURS PRN
Status: DISCONTINUED | OUTPATIENT
Start: 2024-02-21 | End: 2024-02-21

## 2024-02-21 RX ORDER — OXYCODONE AND ACETAMINOPHEN 10; 325 MG/1; MG/1
2 TABLET ORAL EVERY 6 HOURS PRN
Status: DISCONTINUED | OUTPATIENT
Start: 2024-02-21 | End: 2024-02-21

## 2024-02-21 NOTE — PROGRESS NOTES
Ortho Spine Progress Note      No C/O of CP SOB NV. Pain controlled on Percocet 10, 1 tab every 4 hours per RN. Pt currently on CIWA protocol. He is resting comfortably and conversing appropriately during my interview. He apologizes for his behavior yesterday. He states he has urinated and walked with PT. He would like to dc home with wife ASAP once cleared.  AVSS  Dressing is CDI  Motor to bilateral LE at baseline 5/5 throughout  NV intact. Distal pulses are palpable with good cap refill  Homans neg    Lab Results       Component                Value               Date                       CREATSERUM               1.09                02/21/2024                 BUN                      19                  02/21/2024                 NA                       138                 02/21/2024                 K                        3.9                 02/21/2024                 CL                       106                 02/21/2024                 CO2                      24.0                02/21/2024                 GLU                      78                  02/21/2024                 CA                       9.0                 02/21/2024                S/p L3-5 TLIF with posterior instrumentation by Dr. Villafana POD2    Mobilize  PT/OT  NO lifting over 15 pounds or ROM of the lumbar spine  Lumbar brace on at all times  May shower starting Thurs AM with incision covered  Daily dressing changes to start on POD 2 or upon discharge  Please send patient home with gauze and tape  Rx meds in the chart  Continue with medical management as appropriate  May d/c to home from ortho standpoint once cleared by Medicine and PT/OT  Please change dressing before the patient leaves    Follow up with Dr. Villafana in office in 2 weeks      Ivan Morley PA-C  PA with Dr. Clemente Villafana  [facility]

## 2024-02-21 NOTE — PAYOR COMM NOTE
--------------  CONTINUED STAY REVIEW    [facility]  Subscriber #:  [id number]  Authorization Number: [id number]    Admit date: 2/19/24  Admit time:  8:47 AM    Admitting Physician: Eugenia Villafana MD  Attending Physician:  Coretta Brewster MD  Primary Care Physician: Gustabo Rea      2-19-24  PROCEDURE:    1.       L3-4 and L4-5 transforaminal lumbar interbody fusion.  2.       L3-4 and L4-5 posterior instrumented fusion.  3.       L3-4 and L4-5 use of PEEK interbody cage.  4.       L3-4 and L4-5 use of local autograft bone.  5.       L3-4 and L4-5 use of autograft bone  6.       L3-4 and L4-5 decompression.  7.       Use of interpretation C-arm fluoroscopy.  8.       Use of intraoperative neuromonitoring.      REVIEW DOCUMENTATION:   2-20-24     Ortho Spine Progress Note        No C/O of SOB NV. Pain controlled on Percocet 10. Leg symptoms improved. Patient states he would like to leave the hospital today. He has urinated on his own, awaiting PT/OT. He reports CP and states he has not been using his IS.  AVSS  Dressing is CDI  Motor to bilateral LE at baseline 5/5 throughout  NV intact. Distal pulses are palpable with good cap refill  Homans neg     S/p L3-4 TLIF with posterior instrumentation by Dr. Villafana POD1     Mobilize  PT/OT  NO lifting over 15 pounds or ROM of the lumbar spine  Lumbar brace on at all times  Drain output 20cc since midnight, removed by me this am, tip intact.  May shower 48 hours after drain pulled with incision covered      Hospitalist    POD#1. Pt seen today sitting up in the chair. Falling asleep while talking. No c/o SOB or N/V/D. Pain controlled on current regimen.      Vital Signs:  Blood pressure 132/76, pulse 114, temperature 98.2 °F (36.8 °C), temperature source Oral, resp. rate 16, height 5' 6\" (1.676 m), weight 162 lb (73.5 kg), SpO2 95%.  Physical Exam  Vitals and nursing note reviewed.   Constitutional:       Appearance: Normal appearance.   HENT:       Head: Atraumatic.   Pulmonary:      Effort: Pulmonary effort is normal.   Abdominal:      General: Bowel sounds are normal.      Palpations: Abdomen is soft.   Musculoskeletal:      Lumbar back: Tenderness present. Decreased range of motion.      Comments: Lumbar brace   Skin:     General: Skin is warm and dry.   Neurological:      Mental Status: He is alert and oriented to person, place, and time.   Psychiatric:         Mood and Affect: Mood normal.         Behavior: Behavior normal.         Results:            Lab Results   Component Value Date     HGB 13.7 02/20/2024     HCT 42.0 02/20/2024     CREATSERUM 1.16 02/20/2024     BUN 22 02/20/2024      02/20/2024     K 4.9 02/20/2024      02/20/2024     CO2 28.0 02/20/2024      (H) 02/20/2024     CA 9.2 02/20/2024     Assessment/Plan:   *Lumbar radiculopathy  S/P L3-4 and L4-5 lumbar fusion and decompression by Dr. Villafana on 2/19/24.  POD#1  Lumbar brace  Pain control  PT/OT  Bowel protocol  Ortho following      *HLD  Cont atorvastatin     *HTN  Cont enalapril and metoprolol     REVIEW DOCUMENTATION:   2-21-24       +++On O2 at 2-3L NC. With wheezing. Neb tx given.     AVSS  Dressing is CDI  Motor to bilateral LE at baseline 5/5 throughout  NV intact. Distal pulses are palpable with good cap refill  Homans neg    Pain controlled on Percocet 10, 1 tab every 4 hours per RN.     Pt currently on CIWA protocol           S/p L3-5 TLIF with posterior instrumentation by Dr. Villafana POD2     Mobilize  PT/OT  NO lifting over 15 pounds or ROM of the lumbar spine  Lumbar brace on at all times  May shower starting Thurs AM with incision covered  Daily dressing changes to start on POD 2 or upon discharge  Please send patient home with gauze and tape  Rx meds in the chart  Continue with medical management as appropriate  May d/c to home from ortho standpoint once cleared by Medicine and PT/OT    MEDICATIONS ADMINISTERED IN LAST 1 DAY:  albuterol (Ventolin) (5  MG/ML) 0.5% nebulizer solution 2.5 mg       Date Action Dose Route User    2/20/2024 2358 Given 2.5 mg Nebulization Kelly Burris RCP    2/20/2024 1343 Given 2.5 mg Nebulization Caroline Horton, RCP          atorvastatin (Lipitor) tab 20 mg       Date Action Dose Route User    2/20/2024 0853 Given 20 mg Oral Hattie Eugene RN          cyclobenzaprine (Flexeril) tab 5 mg       Date Action Dose Route User    2/20/2024 2100 Given 5 mg Oral Mary Gasca RN    2/20/2024 0853 Given 5 mg Oral Hattie Eugene RN          docusate sodium (Colace) cap 100 mg       Date Action Dose Route User    2/20/2024 0853 Given 100 mg Oral Hattie Eugene RN          enalapril (Vasotec) tab 5 mg       Date Action Dose Route User    2/20/2024 0853 Given 5 mg Oral Hattie Eugene RN          fluticasone propionate (Flonase) 50 MCG/ACT nasal suspension 1 spray       Date Action Dose Route User    2/20/2024 0855 Given 1 spray Nasal (Bilateral Nares) Hattie Eugene RN          gabapentin (Neurontin) cap 300 mg       Date Action Dose Route User    2/20/2024 0853 Given 300 mg Oral Hattie Eugene RN          HYDROmorphone (Dilaudid) 1 MG/ML injection 0.4 mg       Date Action Dose Route User    2/21/2024 0251 Given 0.4 mg Intravenous Mary Gasca RN          LORazepam (Ativan) tab 2 mg       Date Action Dose Route User    2/20/2024 2017 Given 2 mg Oral Mary Gasca RN          metoprolol succinate ER (Toprol XL) 24 hr tab 25 mg       Date Action Dose Route User    2/20/2024 0853 Given 25 mg Oral Hattie Eugene RN          montelukast (Singulair) tab 10 mg       Date Action Dose Route User    2/20/2024 0853 Given 10 mg Oral Hattie Eugene RN          oxyCODONE-acetaminophen (Percocet)  MG per tab 2 tablet       Date Action Dose Route User    2/20/2024 0853 Given 2 tablet Oral Poss, Hattie, RN          oxyCODONE-acetaminophen (Percocet)  MG per tab 1 tablet       Date Action Dose Route User    2/21/2024 8869 Given 1 tablet Oral Campos,  CHAKA Hernandez    2/21/2024 0027 Given 1 tablet Oral Mary Gasca RN    2/20/2024 2017 Given 1 tablet Oral Mary Gasca RN    2/20/2024 1439 Given 1 tablet Oral Hattie Eugene RN            Vitals (last day)       Date/Time Temp Pulse Resp BP SpO2 Weight O2 Device O2 Flow Rate (L/min) Who    02/21/24 0500 -- 111 -- -- -- -- -- -- AG    02/21/24 0436 98.3 °F (36.8 °C) 122 22 137/72 96 % -- Nasal cannula 2 L/min AG    02/21/24 0300 -- 111 -- -- -- -- -- -- AG    02/20/24 1325 -- -- -- -- 95 % -- Nasal cannula 2 L/min DP    02/20/24 1323 98.2 °F (36.8 °C) 114 16 132/76 86 % -- None (Room air) -- DP    02/20/24 1201 97.9 °F (36.6 °C) 63 16 125/75 91 % -- None (Room air) -- LZ    02/20/24 0920 -- -- -- 127/71 -- -- -- -- RW    02/20/24 0750 -- 121 17 158/100 92 % -- None (Room air) -- NT    02/20/24 0600 -- 97 -- 129/83 -- -- -- -- AS    02/20/24 0412 98.4 °F (36.9 °C) 110 16 -- 91 % -- None (Room air) -- AS    02/20/24 0400 -- 106 -- 136/96 -- -- -- -- AS    02/20/24 0344 -- 63 -- -- -- -- -- -- CY    02/20/24 0210 -- 87 -- 104/78 -- -- -- -- AS          CIWA Scores (since admission)       Date/Time CIWA-Ar Total Who    02/21/24 0500 6 AG    02/21/24 0300 2 AG    02/21/24 0100 5 AG    02/20/24 2308 0 AG    02/20/24 2117 1 AG    02/20/24 2011 14 AG    02/20/24 1331 2 DP    02/20/24 0856 7 DP    02/20/24 0600 11 AS    02/20/24 0400 4 AS    02/20/24 0210 2 AS    02/19/24 2357 11 AS    02/19/24 2233 7 AS

## 2024-02-21 NOTE — PHYSICAL THERAPY NOTE
Attempted f/u treatment this AM, chart reviewed. Per RN, pt left AMA this morning with wife.

## 2024-02-21 NOTE — PLAN OF CARE
Patient AO x2-3. Irritable when in pain. Yelling and cursing at staff at times. CIWA protocol. V/s monitored. Up with 2 and r/c. On O2 at 2-3L NC. With wheezing. Neb tx given. Straight cath x1. Able to void after. Teds on. Refused SCDS and LSO brace. Dressing intact. IVF running. Patient moaning and yelling out with pain. Percocet and Dilaudid PRN given. Remote tele placed. Call light within reach. Fall precautions. Plan pending.     Problem: Patient Centered Care  Goal: Patient preferences are identified and integrated in the patient's plan of care  Description: Interventions:  - What would you like us to know as we care for you?   - Provide timely, complete, and accurate information to patient/family  - Incorporate patient and family knowledge, values, beliefs, and cultural backgrounds into the planning and delivery of care  - Encourage patient/family to participate in care and decision-making at the level they choose  - Honor patient and family perspectives and choices  Outcome: Progressing     Problem: Patient/Family Goals  Goal: Patient/Family Long Term Goal  Description: Patient's Long Term Goal:     Interventions:  -   - See additional Care Plan goals for specific interventions  Outcome: Progressing  Goal: Patient/Family Short Term Goal  Description: Patient's Short Term Goal:     Interventions:   -   - See additional Care Plan goals for specific interventions  Outcome: Progressing     Problem: PAIN - ADULT  Goal: Verbalizes/displays adequate comfort level or patient's stated pain goal  Description: INTERVENTIONS:  - Encourage pt to monitor pain and request assistance  - Assess pain using appropriate pain scale  - Administer analgesics based on type and severity of pain and evaluate response  - Implement non-pharmacological measures as appropriate and evaluate response  - Consider cultural and social influences on pain and pain management  - Manage/alleviate anxiety  - Utilize distraction and/or relaxation  techniques  - Monitor for opioid side effects  - Notify MD/LIP if interventions unsuccessful or patient reports new pain  - Anticipate increased pain with activity and pre-medicate as appropriate  Outcome: Progressing     Problem: RISK FOR INFECTION - ADULT  Goal: Absence of fever/infection during anticipated neutropenic period  Description: INTERVENTIONS  - Monitor WBC  - Administer growth factors as ordered  - Implement neutropenic guidelines  Outcome: Progressing     Problem: SAFETY ADULT - FALL  Goal: Free from fall injury  Description: INTERVENTIONS:  - Assess pt frequently for physical needs  - Identify cognitive and physical deficits and behaviors that affect risk of falls.  - [place] fall precautions as indicated by assessment.  - Educate pt/family on patient safety including physical limitations  - Instruct pt to call for assistance with activity based on assessment  - Modify environment to reduce risk of injury  - Provide assistive devices as appropriate  - Consider OT/PT consult to assist with strengthening/mobility  - Encourage toileting schedule  Outcome: Progressing     Problem: DISCHARGE PLANNING  Goal: Discharge to home or other facility with appropriate resources  Description: INTERVENTIONS:  - Identify barriers to discharge w/pt and caregiver  - Include patient/family/discharge partner in discharge planning  - Arrange for needed discharge resources and transportation as appropriate  - Identify discharge learning needs (meds, wound care, etc)  - Arrange for interpreters to assist at discharge as needed  - Consider post-discharge preferences of patient/family/discharge partner  - Complete POLST form as appropriate  - Assess patient's ability to be responsible for managing their own health  - Refer to Case Management Department for coordinating discharge planning if the patient needs post-hospital services based on physician/LIP order or complex needs related to functional status, cognitive ability  or social support system  Outcome: Progressing

## 2024-02-21 NOTE — DISCHARGE SUMMARY
[facility]  Discharge Summary    Facundo Crespo Patient Status:  Inpatient    1960 MRN [id number]   Location [facility] 4W/SW/SE Attending Coretta Brewster MD   Hosp Day # 2 PCP SREEDHAR TY     Date of Admission: 2024    Date of Discharge: No discharge date for patient encounter.    Admitting Diagnosis: Low back pain, not otherwise specified, lumbar radiculopathy, degenerative lumbar spinal stenosis, degeneration of lumbar interverebral disc  Lumbar radiculopathy    Discharge Diagnosis:   Patient Active Problem List   Diagnosis    Lumbar radiculopathy       Reason for Admission: ***    Physical Exam: {exam; complete normal and system select:47429}    History of Present Illness: ***    Hospital Course: ***    Consultations: ***    Procedures: ***    Complications: ***    Disposition: Pt left AMA    Risks/benefits/alternatives discussed with patient as applicable to reason for leaving AMA? {AMADISCHARGE:03034}  Provider(s) contacted: ***  Patient education/AVS/follow-up appointments/prescriptions given? {yes no:564312}  AMA paperwork completed? {yes no:793270}  Removed IV access/decannulated port if applicable and patient belongings returned.    Documented from Admission Navigator:  Belongings at Bedside: Vision  Vision - Corrective Lenses: Glasses  Belongings Sent to Public Safety: None  Medications brought by patient?: No    Discharge Condition: {DISCHARGE CONDITION:}    Discharge Medications:   Current Discharge Medication List        CONTINUE these medications which have NOT CHANGED    Details   montelukast 10 MG Oral Tab Take 1 tablet (10 mg total) by mouth daily.      fluticasone propionate 50 MCG/ACT Nasal Suspension 1 spray by Nasal route daily.      budeson-glycopyrrol-formoterol 160-9-4.8 MCG/ACT Inhalation Aerosol Inhale 2 puffs into the lungs 2 (two) times daily.      metoprolol succinate ER 25 MG Oral Tablet 24 Hr Take 1 tablet (25 mg total) by mouth daily.       atorvastatin 20 MG Oral Tab Take 1 tablet (20 mg total) by mouth daily.      enalapril 5 MG Oral Tab Take 1 tablet (5 mg total) by mouth daily.      albuterol 108 (90 Base) MCG/ACT Inhalation Aero Soln Inhale 2 puffs into the lungs every 6 (six) hours as needed for Wheezing.      albuterol (5 MG/ML) 0.5% Inhalation Nebu Soln Take 0.5 mL (2.5 mg total) by nebulization every 6 (six) hours as needed for Wheezing.      cholecalciferol 125 MCG (5000 UT) Oral Tab Take 1 tablet (5,000 Units total) by mouth daily.      gabapentin 300 MG Oral Cap Take 1 capsule (300 mg total) by mouth in the morning and 1 capsule (300 mg total) before bedtime.             Follow up Visits: Follow-up with *** in {0-10:63669} {units:19995}    Follow up Labs: *** in {0-10:49763} {units:19995}    Other Discharge Instructions: ***    Xiang Barba MD  2/21/2024  9:54 AM

## 2024-02-21 NOTE — PROGRESS NOTES
Reported per this rn during shift change that patient was uncooperative with staff and aggressive on noc shift . During bedside shift report-patient was sleeping. But was called while attending to another patient by tele for sinus tach. When entering patient room he was found to be ambulating in room with PCT. Patient was demanding to go home , get dressed, call his wife, and leave. Tried to assess mental orientation but patient was expressing anger and stated he would not allow staff to do anything. Refusing to answer any questions, Attending notified and sx via perfect serve. Wife called. SANDY then called due to continued aggression towards staff. Security and CRN at bedside. Wife then arrived-patient signed AMA form-see chart records and patient left with spouse-attending and surgery are aware of AMA status

## 2024-02-21 NOTE — PLAN OF CARE
Facundo is from home with spouse. Pod 1 lumbar fusion. Assessment limited by patient's mentation. At baseline alert and oriented x 4 . Does not answer all my questions . Lethargic at times. Per wife patient does not have a hx of etoh use/abuse. Dr nelson aware of AMS. Iv pulled out per patient earlier in otxzm-piflbowyq-zgd in place, hx copd-2 liters NC-neb tx prn mcnamara dc'ed this am -unable to void-straight cath at 1500=500ml-check void, minimal po intake, minimize narcotic use, percocet 1 tab, flexeril afternoon dose held d/t lethargy, ice wrap, scd/estrellita, brace on at all times-patient was not compliant initially with myself and therapy-oob with one assist brace/rolling walker-fall precautions in place. Cont poc  Problem: Patient Centered Care  Goal: Patient preferences are identified and integrated in the patient's plan of care  Description: Interventions:  - What would you like us to know as we care for you? Lives with wife  - Provide timely, complete, and accurate information to patient/family  - Incorporate patient and family knowledge, values, beliefs, and cultural backgrounds into the planning and delivery of care  - Encourage patient/family to participate in care and decision-making at the level they choose  - Honor patient and family perspectives and choices  Outcome: Progressing     Problem: Patient/Family Goals  Goal: Patient/Family Long Term Goal  Description: Patient's Long Term Goal: return to baseline adls    Interventions:  - pt/ot  Mobilize  Pain control  - See additional Care Plan goals for specific interventions  Outcome: Progressing  Goal: Patient/Family Short Term Goal  Description: Patient's Short Term Goal: dc home 1-2 days    Interventions:   - vs/labs wnl  Fifi diet  Pain well controlled  Clear pt/ot  - See additional Care Plan goals for specific interventions  Outcome: Progressing     Problem: PAIN - ADULT  Goal: Verbalizes/displays adequate comfort level or patient's stated pain  goal  Description: INTERVENTIONS:  - Encourage pt to monitor pain and request assistance  - Assess pain using appropriate pain scale  - Administer analgesics based on type and severity of pain and evaluate response  - Implement non-pharmacological measures as appropriate and evaluate response  - Consider cultural and social influences on pain and pain management  - Manage/alleviate anxiety  - Utilize distraction and/or relaxation techniques  - Monitor for opioid side effects  - Notify MD/LIP if interventions unsuccessful or patient reports new pain  - Anticipate increased pain with activity and pre-medicate as appropriate  Outcome: Progressing     Problem: RISK FOR INFECTION - ADULT  Goal: Absence of fever/infection during anticipated neutropenic period  Description: INTERVENTIONS  - Monitor WBC  - Administer growth factors as ordered  - Implement neutropenic guidelines  Outcome: Progressing     Problem: SAFETY ADULT - FALL  Goal: Free from fall injury  Description: INTERVENTIONS:  - Assess pt frequently for physical needs  - Identify cognitive and physical deficits and behaviors that affect risk of falls.  - [place] fall precautions as indicated by assessment.  - Educate pt/family on patient safety including physical limitations  - Instruct pt to call for assistance with activity based on assessment  - Modify environment to reduce risk of injury  - Provide assistive devices as appropriate  - Consider OT/PT consult to assist with strengthening/mobility  - Encourage toileting schedule  Outcome: Progressing     Problem: DISCHARGE PLANNING  Goal: Discharge to home or other facility with appropriate resources  Description: INTERVENTIONS:  - Identify barriers to discharge w/pt and caregiver  - Include patient/family/discharge partner in discharge planning  - Arrange for needed discharge resources and transportation as appropriate  - Identify discharge learning needs (meds, wound care, etc)  - Arrange for interpreters  to assist at discharge as needed  - Consider post-discharge preferences of patient/family/discharge partner  - Complete POLST form as appropriate  - Assess patient's ability to be responsible for managing their own health  - Refer to Case Management Department for coordinating discharge planning if the patient needs post-hospital services based on physician/LIP order or complex needs related to functional status, cognitive ability or social support system  Outcome: Progressing

## 2024-02-23 NOTE — PAYOR COMM NOTE
--------------  DISCHARGE REVIEW    Payor: [facility]  Subscriber #:  [id number]  Authorization Number: [id number]    Admit date: 2/19/24  Admit time:   8:47 AM  Discharge Date: 2/21/2024 10:22 AM     Admitting Physician: Eugenia Villafana MD  Attending Physician:  No att. providers found  Primary Care Physician: Gustabo Rea

## 2024-04-08 ENCOUNTER — EXTERNAL RECORD (OUTPATIENT)
Dept: HEALTH INFORMATION MANAGEMENT | Facility: OTHER | Age: 64
End: 2024-04-08

## 2024-04-26 ENCOUNTER — EXTERNAL RECORD (OUTPATIENT)
Dept: HEALTH INFORMATION MANAGEMENT | Facility: OTHER | Age: 64
End: 2024-04-26

## 2024-05-24 ENCOUNTER — EXTERNAL RECORD (OUTPATIENT)
Dept: HEALTH INFORMATION MANAGEMENT | Facility: OTHER | Age: 64
End: 2024-05-24

## 2024-06-28 ENCOUNTER — EXTERNAL RECORD (OUTPATIENT)
Dept: HEALTH INFORMATION MANAGEMENT | Facility: OTHER | Age: 64
End: 2024-06-28

## 2024-06-28 ENCOUNTER — TELEPHONE (OUTPATIENT)
Dept: NEUROSURGERY | Age: 64
End: 2024-06-28

## 2024-07-05 ENCOUNTER — TELEPHONE (OUTPATIENT)
Dept: NEUROSURGERY | Age: 64
End: 2024-07-05

## 2024-08-13 ENCOUNTER — APPOINTMENT (OUTPATIENT)
Dept: NEUROSURGERY | Age: 64
End: 2024-08-13

## 2024-08-13 VITALS
HEIGHT: 66 IN | BODY MASS INDEX: 25.71 KG/M2 | DIASTOLIC BLOOD PRESSURE: 90 MMHG | WEIGHT: 160 LBS | RESPIRATION RATE: 16 BRPM | HEART RATE: 71 BPM | SYSTOLIC BLOOD PRESSURE: 142 MMHG

## 2024-08-13 DIAGNOSIS — M54.16 LUMBAR RADICULOPATHY: Primary | ICD-10-CM

## 2024-08-13 RX ORDER — HYDROCODONE BITARTRATE AND ACETAMINOPHEN 10; 325 MG/1; MG/1
1 TABLET ORAL EVERY 6 HOURS PRN
COMMUNITY
Start: 2024-04-17

## 2024-08-13 RX ORDER — ALBUTEROL SULFATE 90 UG/1
AEROSOL, METERED RESPIRATORY (INHALATION)
COMMUNITY

## 2024-08-13 RX ORDER — TIZANIDINE 2 MG/1
TABLET ORAL
COMMUNITY

## 2024-08-13 RX ORDER — MONTELUKAST SODIUM 10 MG/1
10 TABLET ORAL DAILY
COMMUNITY

## 2024-08-13 RX ORDER — BUDESONIDE, GLYCOPYRROLATE, AND FORMOTEROL FUMARATE 160; 9; 4.8 UG/1; UG/1; UG/1
2 AEROSOL, METERED RESPIRATORY (INHALATION) 2 TIMES DAILY
COMMUNITY

## 2024-08-13 RX ORDER — METOPROLOL SUCCINATE 25 MG/1
25 TABLET, EXTENDED RELEASE ORAL DAILY
COMMUNITY

## 2024-08-13 RX ORDER — FLUTICASONE PROPIONATE 50 MCG
SPRAY, SUSPENSION (ML) NASAL
COMMUNITY

## 2024-08-13 RX ORDER — ALBUTEROL SULFATE 5 MG/ML
SOLUTION RESPIRATORY (INHALATION)
COMMUNITY

## 2024-08-13 RX ORDER — GABAPENTIN 300 MG/1
300 CAPSULE ORAL 2 TIMES DAILY
COMMUNITY

## 2024-08-21 ENCOUNTER — HOSPITAL ENCOUNTER (OUTPATIENT)
Dept: PHYSICAL MEDICINE AND REHAB | Age: 64
Discharge: STILL ADMITTED OR ADMINISTRATIVE | End: 2024-08-21

## 2024-08-21 PROCEDURE — 97110 THERAPEUTIC EXERCISES: CPT | Performed by: PHYSICAL THERAPIST

## 2024-08-21 PROCEDURE — 97162 PT EVAL MOD COMPLEX 30 MIN: CPT | Performed by: PHYSICAL THERAPIST

## 2024-08-21 ASSESSMENT — ENCOUNTER SYMPTOMS
ALLEVIATING FACTORS: PRESCRIPTION MEDICATIONS
PAIN SCALE AT LOWEST: 4
ALLEVIATING FACTORS: REST
PAIN SCALE AT HIGHEST: 8
PAIN SEVERITY NOW: 4
ALLEVIATING FACTORS: ICE
QUALITY: THROBBING
PAIN DESCRIPTION: STABBING
QUALITY: SHARP
PAIN LOCATION: SEE ABOVE
SUBJECTIVE PAIN PROGRESSION: WORSENING
ALLEVIATING FACTORS: HEAT

## 2024-08-27 ENCOUNTER — APPOINTMENT (OUTPATIENT)
Dept: PHYSICAL MEDICINE AND REHAB | Age: 64
End: 2024-08-27

## 2024-08-28 ENCOUNTER — APPOINTMENT (OUTPATIENT)
Dept: PHYSICAL MEDICINE AND REHAB | Age: 64
End: 2024-08-28

## 2024-08-28 PROCEDURE — 97140 MANUAL THERAPY 1/> REGIONS: CPT | Performed by: PHYSICAL THERAPIST

## 2024-08-28 PROCEDURE — 97110 THERAPEUTIC EXERCISES: CPT | Performed by: PHYSICAL THERAPIST

## 2024-08-28 ASSESSMENT — ENCOUNTER SYMPTOMS: PAIN SEVERITY NOW: 4

## 2024-09-04 ENCOUNTER — TELEPHONE (OUTPATIENT)
Dept: NEUROSURGERY | Age: 64
End: 2024-09-04

## 2024-09-13 ENCOUNTER — APPOINTMENT (OUTPATIENT)
Dept: PHYSICAL MEDICINE AND REHAB | Age: 64
End: 2024-09-13

## 2024-09-18 ENCOUNTER — HOSPITAL ENCOUNTER (OUTPATIENT)
Dept: PHYSICAL MEDICINE AND REHAB | Age: 64
Discharge: STILL ADMITTED OR ADMINISTRATIVE | End: 2024-09-18

## 2024-09-18 PROCEDURE — 97110 THERAPEUTIC EXERCISES: CPT | Performed by: PHYSICAL THERAPIST

## 2024-09-18 ASSESSMENT — ENCOUNTER SYMPTOMS: PAIN SEVERITY NOW: 6

## 2024-09-23 ENCOUNTER — HOSPITAL ENCOUNTER (OUTPATIENT)
Dept: PHYSICAL MEDICINE AND REHAB | Age: 64
Discharge: STILL ADMITTED OR ADMINISTRATIVE | End: 2024-09-23

## 2024-09-23 PROCEDURE — 97110 THERAPEUTIC EXERCISES: CPT | Performed by: PHYSICAL THERAPIST

## 2024-09-23 ASSESSMENT — ENCOUNTER SYMPTOMS
PAIN SEVERITY NOW: 5
PAIN SCALE AT HIGHEST: 7
PAIN SCALE AT LOWEST: 5

## 2024-09-26 ENCOUNTER — APPOINTMENT (OUTPATIENT)
Dept: PHYSICAL MEDICINE AND REHAB | Age: 64
End: 2024-09-26

## 2024-09-26 ASSESSMENT — ENCOUNTER SYMPTOMS
PAIN SCALE AT LOWEST: 5
PAIN SEVERITY NOW: 5
PAIN SCALE AT HIGHEST: 7

## 2024-10-02 ENCOUNTER — APPOINTMENT (OUTPATIENT)
Dept: PHYSICAL MEDICINE AND REHAB | Age: 64
End: 2024-10-02

## 2024-10-09 ENCOUNTER — APPOINTMENT (OUTPATIENT)
Dept: PHYSICAL MEDICINE AND REHAB | Age: 64
End: 2024-10-09

## 2024-10-16 ENCOUNTER — APPOINTMENT (OUTPATIENT)
Dept: PHYSICAL MEDICINE AND REHAB | Age: 64
End: 2024-10-16

## 2024-10-28 ENCOUNTER — APPOINTMENT (OUTPATIENT)
Dept: NEUROSURGERY | Age: 64
End: 2024-10-28

## 2024-10-28 VITALS
HEART RATE: 80 BPM | DIASTOLIC BLOOD PRESSURE: 90 MMHG | BODY MASS INDEX: 25.71 KG/M2 | RESPIRATION RATE: 18 BRPM | SYSTOLIC BLOOD PRESSURE: 159 MMHG | WEIGHT: 160 LBS | HEIGHT: 66 IN | TEMPERATURE: 97.6 F

## 2024-10-28 DIAGNOSIS — Z98.1 S/P LUMBAR SPINAL FUSION: ICD-10-CM

## 2024-10-28 DIAGNOSIS — M54.16 LUMBAR RADICULOPATHY: Primary | ICD-10-CM

## 2024-10-30 ENCOUNTER — APPOINTMENT (OUTPATIENT)
Dept: PHYSICAL MEDICINE AND REHAB | Age: 64
End: 2024-10-30

## 2024-11-06 ENCOUNTER — APPOINTMENT (OUTPATIENT)
Dept: PHYSICAL MEDICINE AND REHAB | Age: 64
End: 2024-11-06

## 2024-11-13 ENCOUNTER — APPOINTMENT (OUTPATIENT)
Dept: PHYSICAL MEDICINE AND REHAB | Age: 64
End: 2024-11-13

## 2024-11-20 ENCOUNTER — APPOINTMENT (OUTPATIENT)
Dept: PHYSICAL MEDICINE AND REHAB | Age: 64
End: 2024-11-20

## 2024-11-21 ENCOUNTER — TELEPHONE (OUTPATIENT)
Dept: NEUROSURGERY | Age: 64
End: 2024-11-21

## 2024-11-27 ENCOUNTER — APPOINTMENT (OUTPATIENT)
Dept: PHYSICAL MEDICINE AND REHAB | Age: 64
End: 2024-11-27

## 2024-12-04 ENCOUNTER — APPOINTMENT (OUTPATIENT)
Dept: PHYSICAL MEDICINE AND REHAB | Age: 64
End: 2024-12-04

## 2024-12-11 ENCOUNTER — APPOINTMENT (OUTPATIENT)
Dept: PHYSICAL MEDICINE AND REHAB | Age: 64
End: 2024-12-11

## 2024-12-18 ENCOUNTER — APPOINTMENT (OUTPATIENT)
Dept: PHYSICAL MEDICINE AND REHAB | Age: 64
End: 2024-12-18

## 2025-01-08 ENCOUNTER — APPOINTMENT (OUTPATIENT)
Dept: PHYSICAL MEDICINE AND REHAB | Age: 65
End: 2025-01-08

## 2025-01-15 ENCOUNTER — APPOINTMENT (OUTPATIENT)
Dept: PHYSICAL MEDICINE AND REHAB | Age: 65
End: 2025-01-15

## 2025-01-22 ENCOUNTER — APPOINTMENT (OUTPATIENT)
Dept: PHYSICAL MEDICINE AND REHAB | Age: 65
End: 2025-01-22

## (undated) DEVICE — MONITORING NEUROPHYSIOLOGICAL

## (undated) DEVICE — GLOVE GAMMEX PI HYBRID LF 8.5

## (undated) DEVICE — DRAPE SHEET LG

## (undated) DEVICE — DRESSING FOAM 1IN 4MM H DISK CHG IMPREG AEGIS

## (undated) DEVICE — GLV RAD SENSICARE BLACK 8.5

## (undated) DEVICE — SPONGE LAP 4X18IN 7IN LOOP

## (undated) DEVICE — KIT SUR ACCS MAXCESS

## (undated) DEVICE — BUR SUR DIA3MM PRECIS NEURO 5 STP NOTCH

## (undated) DEVICE — KIT HEMSTAT MTRX 8ML PORCINE GEL HUM THROM

## (undated) DEVICE — DRAPE C ARM TRNSPAR POLY PROTCT BARR FOR UNIV

## (undated) DEVICE — DRAPE SRG U 124X80IN U REINF

## (undated) DEVICE — TUBING MEGADYNE SPECULUM

## (undated) DEVICE — STRIP SKIN CLSR W0.5XL4IN REINF NINVAS DSGN

## (undated) DEVICE — TRAY CATH 16FR F INCLUDE BARDX IC COMPLT CARE

## (undated) DEVICE — SYRINGE BLB 60CC CATH TIP FOR IRRIG AND

## (undated) DEVICE — SPONGE GZ W4XL4IN 12 PLY COT WVN C FLD DSGN

## (undated) DEVICE — SUTURE VCRL SZ 2-0 L18IN ABSRB VLT L26MM CT-2

## (undated) DEVICE — SPONGE SUR 4X4IN WVN 16 PLY PREM RADPQ VISTEC

## (undated) DEVICE — SEALER BPLR ELECTRD L5.74MM DIA3.48MM CNTR

## (undated) DEVICE — PACK CDS LAMINECTOMY

## (undated) DEVICE — PENCIL ES BTTN SWCH W/ TIP HOLSTER E-Z CLN

## (undated) DEVICE — SUTURE VCRL 0 L27IN ABSRB VLT L36MM CT-1 1/2

## (undated) DEVICE — MARKER SUR SKIN ST GENTIAN VLT STD REG TIP

## (undated) DEVICE — COVER TBL W60XL90IN STD PUNC RESIST LO

## (undated) DEVICE — SYSTEM DEL GRFT MOD MAS

## (undated) DEVICE — WRAP COOLING BACK W/NO PILLOW

## (undated) DEVICE — TRAY INSTR PWR NUVASIVE

## (undated) DEVICE — SPONGE GZ W4XL4IN 100% COT 16 PLY HIGHLY

## (undated) DEVICE — GLOVE GAMMEX PI HYBRID LF 8.0

## (undated) DEVICE — PAD ABD W7.5XL8IN SFT NWVN OTR LAYR

## (undated) DEVICE — NEEDLE NRV STIM BVL TIP INSUL PEDCL ACCS SYS

## (undated) DEVICE — SOLUTION IRRIG 1000ML 0.9% NACL USP BTL

## (undated) DEVICE — FORCEP BIPOLAR SPETZLER 9X1.0

## (undated) DEVICE — KIT EVAC 400CC DIA1/8IN H PAT 12.5IN 3 SPR

## (undated) DEVICE — GLOVE GAMMEX PI ORTHO LF 8.0

## (undated) DEVICE — GLOVE GAMMEX PI ORTHO LF 7.0

## (undated) DEVICE — ADHESIVE SKIN TOP FOR WND CLSR DERMBND ADV